# Patient Record
Sex: FEMALE | Race: WHITE | NOT HISPANIC OR LATINO | Employment: OTHER | URBAN - METROPOLITAN AREA
[De-identification: names, ages, dates, MRNs, and addresses within clinical notes are randomized per-mention and may not be internally consistent; named-entity substitution may affect disease eponyms.]

---

## 2017-05-09 ENCOUNTER — HISTORICAL (OUTPATIENT)
Dept: ADMINISTRATIVE | Facility: HOSPITAL | Age: 57
End: 2017-05-09

## 2017-05-09 LAB
ABS NEUT (OLG): 2.2 X10(3)/MCL (ref 2.1–9.2)
ALBUMIN SERPL-MCNC: 3.7 GM/DL (ref 3.4–5)
ALBUMIN/GLOB SERPL: 1.3 {RATIO}
ALP SERPL-CCNC: 65 UNIT/L (ref 38–126)
ALT SERPL-CCNC: 22 UNIT/L (ref 12–78)
AST SERPL-CCNC: 14 UNIT/L (ref 15–37)
BASOPHILS # BLD AUTO: 0 X10(3)/MCL (ref 0–0.2)
BASOPHILS NFR BLD AUTO: 1 %
BILIRUB SERPL-MCNC: 0.4 MG/DL (ref 0.2–1)
BILIRUBIN DIRECT+TOT PNL SERPL-MCNC: 0.1 MG/DL (ref 0–0.2)
BILIRUBIN DIRECT+TOT PNL SERPL-MCNC: 0.3 MG/DL (ref 0–0.8)
BUN SERPL-MCNC: 12 MG/DL (ref 7–18)
CALCIUM SERPL-MCNC: 8.9 MG/DL (ref 8.5–10.1)
CHLORIDE SERPL-SCNC: 105 MMOL/L (ref 98–107)
CHOLEST SERPL-MCNC: 250 MG/DL (ref 0–200)
CHOLEST/HDLC SERPL: 2.7 {RATIO} (ref 0–4)
CO2 SERPL-SCNC: 31 MMOL/L (ref 21–32)
CREAT SERPL-MCNC: 0.66 MG/DL (ref 0.55–1.02)
DEPRECATED CALCIDIOL+CALCIFEROL SERPL-MC: 40.7 NG/ML (ref 30–80)
EOSINOPHIL # BLD AUTO: 0.1 X10(3)/MCL (ref 0–0.9)
EOSINOPHIL NFR BLD AUTO: 2 %
ERYTHROCYTE [DISTWIDTH] IN BLOOD BY AUTOMATED COUNT: 12.2 % (ref 11.5–17)
GLOBULIN SER-MCNC: 2.9 GM/DL (ref 2.4–3.5)
GLUCOSE SERPL-MCNC: 94 MG/DL (ref 74–106)
HCT VFR BLD AUTO: 41.1 % (ref 37–47)
HCV AB SERPL QL IA: NEGATIVE
HDLC SERPL-MCNC: 93 MG/DL (ref 35–60)
HGB BLD-MCNC: 13.4 GM/DL (ref 12–16)
LDLC SERPL CALC-MCNC: 127 MG/DL (ref 0–129)
LYMPHOCYTES # BLD AUTO: 1.4 X10(3)/MCL (ref 0.6–4.6)
LYMPHOCYTES NFR BLD AUTO: 34 %
MCH RBC QN AUTO: 31.1 PG (ref 27–31)
MCHC RBC AUTO-ENTMCNC: 32.6 GM/DL (ref 33–36)
MCV RBC AUTO: 95.4 FL (ref 80–94)
MONOCYTES # BLD AUTO: 0.3 X10(3)/MCL (ref 0.1–1.3)
MONOCYTES NFR BLD AUTO: 7 %
NEUTROPHILS # BLD AUTO: 2.2 X10(3)/MCL (ref 2.1–9.2)
NEUTROPHILS NFR BLD AUTO: 55 %
PLATELET # BLD AUTO: 241 X10(3)/MCL (ref 130–400)
PMV BLD AUTO: 9.6 FL (ref 9.4–12.4)
POTASSIUM SERPL-SCNC: 4.2 MMOL/L (ref 3.5–5.1)
PROT SERPL-MCNC: 6.6 GM/DL (ref 6.4–8.2)
RBC # BLD AUTO: 4.31 X10(6)/MCL (ref 4.2–5.4)
SODIUM SERPL-SCNC: 141 MMOL/L (ref 136–145)
TRIGL SERPL-MCNC: 151 MG/DL (ref 30–150)
TSH SERPL-ACNC: 1.29 MIU/ML (ref 0.36–3.74)
VLDLC SERPL CALC-MCNC: 30 MG/DL
WBC # SPEC AUTO: 4 X10(3)/MCL (ref 4.5–11.5)

## 2017-10-19 ENCOUNTER — HISTORICAL (OUTPATIENT)
Dept: ADMINISTRATIVE | Facility: HOSPITAL | Age: 57
End: 2017-10-19

## 2017-10-19 LAB
ABS NEUT (OLG): 2.88 X10(3)/MCL (ref 2.1–9.2)
ALBUMIN SERPL-MCNC: 3.9 GM/DL (ref 3.4–5)
ALBUMIN/GLOB SERPL: 1.4 {RATIO}
ALP SERPL-CCNC: 68 UNIT/L (ref 38–126)
ALT SERPL-CCNC: 26 UNIT/L (ref 12–78)
APPEARANCE, UA: CLEAR
AST SERPL-CCNC: 15 UNIT/L (ref 15–37)
BACTERIA SPEC CULT: NORMAL /HPF
BASOPHILS # BLD AUTO: 0 X10(3)/MCL (ref 0–0.2)
BASOPHILS NFR BLD AUTO: 1 %
BILIRUB SERPL-MCNC: 0.3 MG/DL (ref 0.2–1)
BILIRUB UR QL STRIP: NEGATIVE
BILIRUBIN DIRECT+TOT PNL SERPL-MCNC: 0.1 MG/DL (ref 0–0.2)
BILIRUBIN DIRECT+TOT PNL SERPL-MCNC: 0.2 MG/DL (ref 0–0.8)
BUN SERPL-MCNC: 22 MG/DL (ref 7–18)
CALCIUM SERPL-MCNC: 8.7 MG/DL (ref 8.5–10.1)
CHLORIDE SERPL-SCNC: 105 MMOL/L (ref 98–107)
CO2 SERPL-SCNC: 28 MMOL/L (ref 21–32)
COLOR UR: YELLOW
CREAT SERPL-MCNC: 0.63 MG/DL (ref 0.55–1.02)
EOSINOPHIL # BLD AUTO: 0.1 X10(3)/MCL (ref 0–0.9)
EOSINOPHIL NFR BLD AUTO: 1 %
ERYTHROCYTE [DISTWIDTH] IN BLOOD BY AUTOMATED COUNT: 11.9 % (ref 11.5–17)
GLOBULIN SER-MCNC: 2.8 GM/DL (ref 2.4–3.5)
GLUCOSE (UA): NEGATIVE
GLUCOSE SERPL-MCNC: 92 MG/DL (ref 74–106)
HCT VFR BLD AUTO: 40 % (ref 37–47)
HGB BLD-MCNC: 13.8 GM/DL (ref 12–16)
HGB UR QL STRIP: NEGATIVE
KETONES UR QL STRIP: NEGATIVE
LEUKOCYTE ESTERASE UR QL STRIP: NEGATIVE
LYMPHOCYTES # BLD AUTO: 1.7 X10(3)/MCL (ref 0.6–4.6)
LYMPHOCYTES NFR BLD AUTO: 33 %
MCH RBC QN AUTO: 32.7 PG (ref 27–31)
MCHC RBC AUTO-ENTMCNC: 34.5 GM/DL (ref 33–36)
MCV RBC AUTO: 94.8 FL (ref 80–94)
MONOCYTES # BLD AUTO: 0.5 X10(3)/MCL (ref 0.1–1.3)
MONOCYTES NFR BLD AUTO: 9 %
NEUTROPHILS # BLD AUTO: 2.88 X10(3)/MCL (ref 1.4–7.9)
NEUTROPHILS NFR BLD AUTO: 55 %
NITRITE UR QL STRIP: NEGATIVE
PH UR STRIP: 6.5 [PH] (ref 5–9)
PLATELET # BLD AUTO: 207 X10(3)/MCL (ref 130–400)
PMV BLD AUTO: 9 FL (ref 9.4–12.4)
POTASSIUM SERPL-SCNC: 4.1 MMOL/L (ref 3.5–5.1)
PROT SERPL-MCNC: 6.7 GM/DL (ref 6.4–8.2)
PROT UR QL STRIP: NEGATIVE
RBC # BLD AUTO: 4.22 X10(6)/MCL (ref 4.2–5.4)
RBC #/AREA URNS HPF: NORMAL /[HPF]
SODIUM SERPL-SCNC: 140 MMOL/L (ref 136–145)
SP GR UR STRIP: 1.02 (ref 1–1.03)
SQUAMOUS EPITHELIAL, UA: NORMAL
UROBILINOGEN UR STRIP-ACNC: 0.2
WBC # SPEC AUTO: 5.2 X10(3)/MCL (ref 4.5–11.5)
WBC #/AREA URNS HPF: NORMAL /[HPF]

## 2018-06-04 ENCOUNTER — HISTORICAL (OUTPATIENT)
Dept: ADMINISTRATIVE | Facility: HOSPITAL | Age: 58
End: 2018-06-04

## 2018-06-04 LAB
ABS NEUT (OLG): 2.81 X10(3)/MCL (ref 2.1–9.2)
ALBUMIN SERPL-MCNC: 3.7 GM/DL (ref 3.4–5)
ALBUMIN/GLOB SERPL: 1.3 {RATIO}
ALP SERPL-CCNC: 61 UNIT/L (ref 38–126)
ALT SERPL-CCNC: 19 UNIT/L (ref 12–78)
APPEARANCE, UA: ABNORMAL
AST SERPL-CCNC: 14 UNIT/L (ref 15–37)
BACTERIA SPEC CULT: ABNORMAL /HPF
BASOPHILS # BLD AUTO: 0 X10(3)/MCL (ref 0–0.2)
BASOPHILS NFR BLD AUTO: 1 %
BILIRUB SERPL-MCNC: 0.5 MG/DL (ref 0.2–1)
BILIRUB UR QL STRIP: NEGATIVE
BILIRUBIN DIRECT+TOT PNL SERPL-MCNC: 0.1 MG/DL (ref 0–0.2)
BILIRUBIN DIRECT+TOT PNL SERPL-MCNC: 0.4 MG/DL (ref 0–0.8)
BUN SERPL-MCNC: 22 MG/DL (ref 7–18)
CALCIUM SERPL-MCNC: 8.6 MG/DL (ref 8.5–10.1)
CHLORIDE SERPL-SCNC: 106 MMOL/L (ref 98–107)
CHOLEST SERPL-MCNC: 238 MG/DL (ref 0–200)
CHOLEST/HDLC SERPL: 2.4 {RATIO} (ref 0–4)
CO2 SERPL-SCNC: 29 MMOL/L (ref 21–32)
COLOR UR: YELLOW
CREAT SERPL-MCNC: 0.6 MG/DL (ref 0.55–1.02)
DEPRECATED CALCIDIOL+CALCIFEROL SERPL-MC: 31 NG/ML (ref 30–80)
EOSINOPHIL # BLD AUTO: 0.1 X10(3)/MCL (ref 0–0.9)
EOSINOPHIL NFR BLD AUTO: 2 %
ERYTHROCYTE [DISTWIDTH] IN BLOOD BY AUTOMATED COUNT: 11.7 % (ref 11.5–17)
GLOBULIN SER-MCNC: 2.9 GM/DL (ref 2.4–3.5)
GLUCOSE (UA): NEGATIVE
GLUCOSE SERPL-MCNC: 88 MG/DL (ref 74–106)
HCT VFR BLD AUTO: 41.2 % (ref 37–47)
HDLC SERPL-MCNC: 99 MG/DL (ref 35–60)
HGB BLD-MCNC: 13.3 GM/DL (ref 12–16)
HGB UR QL STRIP: ABNORMAL
KETONES UR QL STRIP: NEGATIVE
LDLC SERPL CALC-MCNC: 126 MG/DL (ref 0–129)
LEUKOCYTE ESTERASE UR QL STRIP: ABNORMAL
LYMPHOCYTES # BLD AUTO: 1.6 X10(3)/MCL (ref 0.6–4.6)
LYMPHOCYTES NFR BLD AUTO: 32 %
MCH RBC QN AUTO: 31.4 PG (ref 27–31)
MCHC RBC AUTO-ENTMCNC: 32.3 GM/DL (ref 33–36)
MCV RBC AUTO: 97.2 FL (ref 80–94)
MONOCYTES # BLD AUTO: 0.5 X10(3)/MCL (ref 0.1–1.3)
MONOCYTES NFR BLD AUTO: 10 %
NEUTROPHILS # BLD AUTO: 2.81 X10(3)/MCL (ref 1.4–7.9)
NEUTROPHILS NFR BLD AUTO: 56 %
NITRITE UR QL STRIP: NEGATIVE
PH UR STRIP: 5.5 [PH] (ref 5–9)
PLATELET # BLD AUTO: 231 X10(3)/MCL (ref 130–400)
PMV BLD AUTO: 9.4 FL (ref 9.4–12.4)
POTASSIUM SERPL-SCNC: 4.1 MMOL/L (ref 3.5–5.1)
PROT SERPL-MCNC: 6.6 GM/DL (ref 6.4–8.2)
PROT UR QL STRIP: ABNORMAL
RBC # BLD AUTO: 4.24 X10(6)/MCL (ref 4.2–5.4)
RBC #/AREA URNS HPF: ABNORMAL /[HPF]
SODIUM SERPL-SCNC: 143 MMOL/L (ref 136–145)
SP GR UR STRIP: 1.03 (ref 1–1.03)
SQUAMOUS EPITHELIAL, UA: 10 /HPF (ref 0–4)
TRIGL SERPL-MCNC: 67 MG/DL (ref 30–150)
TSH SERPL-ACNC: 1.05 MIU/L (ref 0.36–3.74)
UROBILINOGEN UR STRIP-ACNC: 1
VLDLC SERPL CALC-MCNC: 13 MG/DL
WBC # SPEC AUTO: 5 X10(3)/MCL (ref 4.5–11.5)
WBC #/AREA URNS HPF: 29 /HPF (ref 0–3)

## 2018-08-28 LAB — CRC RECOMMENDATION EXT: NORMAL

## 2018-11-05 ENCOUNTER — HISTORICAL (OUTPATIENT)
Dept: ADMINISTRATIVE | Facility: HOSPITAL | Age: 58
End: 2018-11-05

## 2018-11-05 LAB
ABS NEUT (OLG): 2.86 X10(3)/MCL (ref 2.1–9.2)
APTT PPP: 25.2 SECOND(S) (ref 24.8–36.9)
BASOPHILS # BLD AUTO: 0 X10(3)/MCL (ref 0–0.2)
BASOPHILS NFR BLD AUTO: 1 %
EOSINOPHIL # BLD AUTO: 0 X10(3)/MCL (ref 0–0.9)
EOSINOPHIL NFR BLD AUTO: 1 %
ERYTHROCYTE [DISTWIDTH] IN BLOOD BY AUTOMATED COUNT: 11.9 % (ref 11.5–17)
ERYTHROCYTE [SEDIMENTATION RATE] IN BLOOD: 14 MM/HR (ref 0–20)
HCT VFR BLD AUTO: 37.6 % (ref 37–47)
HGB BLD-MCNC: 12.4 GM/DL (ref 12–16)
INR PPP: 0.96 (ref 0–1.27)
LYMPHOCYTES # BLD AUTO: 1 X10(3)/MCL (ref 0.6–4.6)
LYMPHOCYTES NFR BLD AUTO: 22 %
MCH RBC QN AUTO: 31.7 PG (ref 27–31)
MCHC RBC AUTO-ENTMCNC: 33 GM/DL (ref 33–36)
MCV RBC AUTO: 96.2 FL (ref 80–94)
MONOCYTES # BLD AUTO: 0.4 X10(3)/MCL (ref 0.1–1.3)
MONOCYTES NFR BLD AUTO: 10 %
NEUTROPHILS # BLD AUTO: 2.86 X10(3)/MCL (ref 2.1–9.2)
NEUTROPHILS NFR BLD AUTO: 66 %
PLATELET # BLD AUTO: 214 X10(3)/MCL (ref 130–400)
PMV BLD AUTO: 8.8 FL (ref 9.4–12.4)
PROTHROMBIN TIME: 13.1 SECOND(S) (ref 12.2–14.7)
RBC # BLD AUTO: 3.91 X10(6)/MCL (ref 4.2–5.4)
URATE SERPL-MCNC: 2.8 MG/DL (ref 2.6–7.2)
WBC # SPEC AUTO: 4.4 X10(3)/MCL (ref 4.5–11.5)

## 2019-02-05 ENCOUNTER — HISTORICAL (OUTPATIENT)
Dept: ADMINISTRATIVE | Facility: HOSPITAL | Age: 59
End: 2019-02-05

## 2019-07-08 ENCOUNTER — HISTORICAL (OUTPATIENT)
Dept: ADMINISTRATIVE | Facility: HOSPITAL | Age: 59
End: 2019-07-08

## 2019-07-08 LAB
ABS NEUT (OLG): 2.12 X10(3)/MCL (ref 2.1–9.2)
ALBUMIN SERPL-MCNC: 3.9 GM/DL (ref 3.4–5)
ALBUMIN/GLOB SERPL: 1.4 RATIO (ref 1.1–2)
ALP SERPL-CCNC: 68 UNIT/L (ref 38–126)
ALT SERPL-CCNC: 27 UNIT/L (ref 12–78)
APPEARANCE, UA: ABNORMAL
AST SERPL-CCNC: 17 UNIT/L (ref 15–37)
BACTERIA SPEC CULT: ABNORMAL /HPF
BASOPHILS NFR BLD MANUAL: 1 % (ref 0–2)
BILIRUB SERPL-MCNC: 0.6 MG/DL (ref 0.2–1)
BILIRUB UR QL STRIP: NEGATIVE
BILIRUBIN DIRECT+TOT PNL SERPL-MCNC: 0.2 MG/DL (ref 0–0.5)
BILIRUBIN DIRECT+TOT PNL SERPL-MCNC: 0.4 MG/DL (ref 0–0.8)
BUN SERPL-MCNC: 19 MG/DL (ref 7–18)
CALCIUM SERPL-MCNC: 8.9 MG/DL (ref 8.5–10.1)
CHLORIDE SERPL-SCNC: 107 MMOL/L (ref 98–107)
CHOLEST SERPL-MCNC: 227 MG/DL (ref 0–200)
CHOLEST/HDLC SERPL: 2.4 {RATIO} (ref 0–4)
CO2 SERPL-SCNC: 30 MMOL/L (ref 21–32)
COLOR UR: YELLOW
CREAT SERPL-MCNC: 0.66 MG/DL (ref 0.55–1.02)
DEPRECATED CALCIDIOL+CALCIFEROL SERPL-MC: 41.03 NG/ML (ref 30–80)
ERYTHROCYTE [DISTWIDTH] IN BLOOD BY AUTOMATED COUNT: 12.2 % (ref 11.5–17)
GLOBULIN SER-MCNC: 2.7 GM/DL (ref 2.4–3.5)
GLUCOSE (UA): NEGATIVE
GLUCOSE SERPL-MCNC: 99 MG/DL (ref 74–106)
HCT VFR BLD AUTO: 40.5 % (ref 37–47)
HDLC SERPL-MCNC: 95 MG/DL (ref 35–60)
HGB BLD-MCNC: 12.8 GM/DL (ref 12–16)
HGB UR QL STRIP: ABNORMAL
KETONES UR QL STRIP: NEGATIVE
LDLC SERPL CALC-MCNC: 115 MG/DL (ref 0–129)
LEUKOCYTE ESTERASE UR QL STRIP: ABNORMAL
LYMPHOCYTES NFR BLD MANUAL: 32 % (ref 13–40)
MCH RBC QN AUTO: 30.9 PG (ref 27–31)
MCHC RBC AUTO-ENTMCNC: 31.6 GM/DL (ref 33–36)
MCV RBC AUTO: 97.8 FL (ref 80–94)
MONOCYTES NFR BLD MANUAL: 13 % (ref 2–11)
NEUTROPHILS NFR BLD MANUAL: 54 % (ref 47–80)
NITRITE UR QL STRIP: NEGATIVE
PH UR STRIP: 6 [PH] (ref 5–9)
PLATELET # BLD AUTO: 208 X10(3)/MCL (ref 130–400)
PLATELET # BLD EST: NORMAL 10*3/UL
PMV BLD AUTO: 9 FL (ref 7.4–10.4)
POTASSIUM SERPL-SCNC: 3.9 MMOL/L (ref 3.5–5.1)
PROT SERPL-MCNC: 6.6 GM/DL (ref 6.4–8.2)
PROT UR QL STRIP: ABNORMAL
RBC # BLD AUTO: 4.14 X10(6)/MCL (ref 4.2–5.4)
RBC #/AREA URNS HPF: 5 /HPF (ref 0–2)
SODIUM SERPL-SCNC: 142 MMOL/L (ref 136–145)
SP GR UR STRIP: 1.03 (ref 1–1.03)
SQUAMOUS EPITHELIAL, UA: ABNORMAL
TRIGL SERPL-MCNC: 84 MG/DL (ref 30–150)
TSH SERPL-ACNC: 2.22 MIU/L (ref 0.36–3.74)
UROBILINOGEN UR STRIP-ACNC: 1
VLDLC SERPL CALC-MCNC: 17 MG/DL
WBC # SPEC AUTO: 3.8 X10(3)/MCL (ref 4.5–11.5)
WBC #/AREA URNS HPF: 49 /HPF (ref 0–3)

## 2019-07-09 LAB — FINAL CULTURE: NORMAL

## 2020-07-14 ENCOUNTER — HISTORICAL (OUTPATIENT)
Dept: ADMINISTRATIVE | Facility: HOSPITAL | Age: 60
End: 2020-07-14

## 2020-07-14 LAB
ABS NEUT (OLG): 1.95 X10(3)/MCL (ref 2.1–9.2)
ALBUMIN SERPL-MCNC: 3.5 GM/DL (ref 3.5–5)
ALBUMIN/GLOB SERPL: 1.5 RATIO (ref 1.1–2)
ALP SERPL-CCNC: 56 UNIT/L (ref 40–150)
ALT SERPL-CCNC: 14 UNIT/L (ref 0–55)
APPEARANCE, UA: CLEAR
AST SERPL-CCNC: 14 UNIT/L (ref 5–34)
BACTERIA SPEC CULT: ABNORMAL /HPF
BASOPHILS # BLD AUTO: 0 X10(3)/MCL (ref 0–0.2)
BASOPHILS NFR BLD AUTO: 1 %
BILIRUB SERPL-MCNC: 0.4 MG/DL
BILIRUB UR QL STRIP: NEGATIVE
BILIRUBIN DIRECT+TOT PNL SERPL-MCNC: 0.2 MG/DL (ref 0–0.5)
BILIRUBIN DIRECT+TOT PNL SERPL-MCNC: 0.2 MG/DL (ref 0–0.8)
BUN SERPL-MCNC: 25.9 MG/DL (ref 9.8–20.1)
CALCIUM SERPL-MCNC: 8.4 MG/DL (ref 8.4–10.2)
CHLORIDE SERPL-SCNC: 108 MMOL/L (ref 98–107)
CHOLEST SERPL-MCNC: 228 MG/DL
CHOLEST/HDLC SERPL: 3 {RATIO} (ref 0–5)
CO2 SERPL-SCNC: 27 MMOL/L (ref 22–29)
COLOR UR: YELLOW
CREAT SERPL-MCNC: 0.62 MG/DL (ref 0.55–1.02)
DEPRECATED CALCIDIOL+CALCIFEROL SERPL-MC: 49.3 NG/ML (ref 6.6–49.9)
EOSINOPHIL # BLD AUTO: 0.1 X10(3)/MCL (ref 0–0.9)
EOSINOPHIL NFR BLD AUTO: 4 %
ERYTHROCYTE [DISTWIDTH] IN BLOOD BY AUTOMATED COUNT: 12 % (ref 11.5–17)
GLOBULIN SER-MCNC: 2.4 GM/DL (ref 2.4–3.5)
GLUCOSE (UA): NEGATIVE
GLUCOSE SERPL-MCNC: 96 MG/DL (ref 74–100)
HCT VFR BLD AUTO: 37.5 % (ref 37–47)
HCV AB SERPL QL IA: NONREACTIVE
HDLC SERPL-MCNC: 82 MG/DL (ref 35–60)
HGB BLD-MCNC: 12.5 GM/DL (ref 12–16)
HGB UR QL STRIP: NEGATIVE
KETONES UR QL STRIP: NEGATIVE
LDLC SERPL CALC-MCNC: 138 MG/DL (ref 50–140)
LEUKOCYTE ESTERASE UR QL STRIP: ABNORMAL
LYMPHOCYTES # BLD AUTO: 1 X10(3)/MCL (ref 0.6–4.6)
LYMPHOCYTES NFR BLD AUTO: 30 %
MCH RBC QN AUTO: 31.3 PG (ref 27–31)
MCHC RBC AUTO-ENTMCNC: 33.3 GM/DL (ref 33–36)
MCV RBC AUTO: 94 FL (ref 80–94)
MONOCYTES # BLD AUTO: 0.3 X10(3)/MCL (ref 0.1–1.3)
MONOCYTES NFR BLD AUTO: 9 %
NEUTROPHILS # BLD AUTO: 1.95 X10(3)/MCL (ref 2.1–9.2)
NEUTROPHILS NFR BLD AUTO: 56 %
NITRITE UR QL STRIP: NEGATIVE
PH UR STRIP: 5 [PH] (ref 5–9)
PLATELET # BLD AUTO: 192 X10(3)/MCL (ref 130–400)
PMV BLD AUTO: 9.1 FL (ref 9.4–12.4)
POTASSIUM SERPL-SCNC: 4.3 MMOL/L (ref 3.5–5.1)
PROT SERPL-MCNC: 5.9 GM/DL (ref 6.4–8.3)
PROT UR QL STRIP: NEGATIVE
RBC # BLD AUTO: 3.99 X10(6)/MCL (ref 4.2–5.4)
RBC #/AREA URNS HPF: ABNORMAL /[HPF]
SODIUM SERPL-SCNC: 142 MMOL/L (ref 136–145)
SP GR UR STRIP: 1.02 (ref 1–1.03)
SQUAMOUS EPITHELIAL, UA: ABNORMAL
TRIGL SERPL-MCNC: 38 MG/DL (ref 37–140)
TSH SERPL-ACNC: 1.36 UIU/ML (ref 0.35–4.94)
UROBILINOGEN UR STRIP-ACNC: 0.2
VLDLC SERPL CALC-MCNC: 8 MG/DL
WBC # SPEC AUTO: 3.5 X10(3)/MCL (ref 4.5–11.5)
WBC #/AREA URNS HPF: ABNORMAL /[HPF]

## 2021-10-27 ENCOUNTER — HISTORICAL (OUTPATIENT)
Dept: ADMINISTRATIVE | Facility: HOSPITAL | Age: 61
End: 2021-10-27

## 2021-10-27 LAB
ABS NEUT (OLG): 2.61 X10(3)/MCL (ref 2.1–9.2)
ALBUMIN SERPL-MCNC: 3.7 GM/DL (ref 3.4–4.8)
ALBUMIN/GLOB SERPL: 1.5 RATIO (ref 1.1–2)
ALP SERPL-CCNC: 56 UNIT/L (ref 40–150)
ALT SERPL-CCNC: 16 UNIT/L (ref 0–55)
APPEARANCE, UA: CLEAR
AST SERPL-CCNC: 17 UNIT/L (ref 5–34)
BACTERIA SPEC CULT: ABNORMAL /HPF
BASOPHILS # BLD AUTO: 0.1 X10(3)/MCL (ref 0–0.2)
BASOPHILS NFR BLD AUTO: 1 %
BILIRUB SERPL-MCNC: 0.6 MG/DL
BILIRUB UR QL STRIP: NEGATIVE
BILIRUBIN DIRECT+TOT PNL SERPL-MCNC: 0.2 MG/DL (ref 0–0.5)
BILIRUBIN DIRECT+TOT PNL SERPL-MCNC: 0.4 MG/DL (ref 0–0.8)
BUN SERPL-MCNC: 20.5 MG/DL (ref 9.8–20.1)
CALCIUM SERPL-MCNC: 9.1 MG/DL (ref 8.7–10.5)
CHLORIDE SERPL-SCNC: 110 MMOL/L (ref 98–107)
CHOLEST SERPL-MCNC: 257 MG/DL
CHOLEST/HDLC SERPL: 3 {RATIO} (ref 0–5)
CO2 SERPL-SCNC: 28 MMOL/L (ref 23–31)
COLOR UR: YELLOW
CREAT SERPL-MCNC: 0.73 MG/DL (ref 0.55–1.02)
DEPRECATED CALCIDIOL+CALCIFEROL SERPL-MC: 62.6 NG/ML (ref 30–80)
EOSINOPHIL # BLD AUTO: 0.1 X10(3)/MCL (ref 0–0.9)
EOSINOPHIL NFR BLD AUTO: 3 %
ERYTHROCYTE [DISTWIDTH] IN BLOOD BY AUTOMATED COUNT: 12.1 % (ref 11.5–17)
GLOBULIN SER-MCNC: 2.4 GM/DL (ref 2.4–3.5)
GLUCOSE (UA): NEGATIVE
GLUCOSE SERPL-MCNC: 97 MG/DL (ref 82–115)
HCT VFR BLD AUTO: 38.6 % (ref 37–47)
HDLC SERPL-MCNC: 91 MG/DL (ref 35–60)
HGB BLD-MCNC: 12.9 GM/DL (ref 12–16)
HGB UR QL STRIP: NEGATIVE
KETONES UR QL STRIP: NEGATIVE
LDLC SERPL CALC-MCNC: 154 MG/DL (ref 50–140)
LEUKOCYTE ESTERASE UR QL STRIP: ABNORMAL
LYMPHOCYTES # BLD AUTO: 1.3 X10(3)/MCL (ref 0.6–4.6)
LYMPHOCYTES NFR BLD AUTO: 28 %
MCH RBC QN AUTO: 32.3 PG (ref 27–31)
MCHC RBC AUTO-ENTMCNC: 33.4 GM/DL (ref 33–36)
MCV RBC AUTO: 96.5 FL (ref 80–94)
MONOCYTES # BLD AUTO: 0.5 X10(3)/MCL (ref 0.1–1.3)
MONOCYTES NFR BLD AUTO: 12 %
NEUTROPHILS # BLD AUTO: 2.61 X10(3)/MCL (ref 2.1–9.2)
NEUTROPHILS NFR BLD AUTO: 57 %
NITRITE UR QL STRIP: NEGATIVE
PH UR STRIP: 5 [PH] (ref 5–9)
PLATELET # BLD AUTO: 245 X10(3)/MCL (ref 130–400)
PMV BLD AUTO: 9.7 FL (ref 9.4–12.4)
POTASSIUM SERPL-SCNC: 4.7 MMOL/L (ref 3.5–5.1)
PROT SERPL-MCNC: 6.1 GM/DL (ref 5.8–7.6)
PROT UR QL STRIP: NEGATIVE
RBC # BLD AUTO: 4 X10(6)/MCL (ref 4.2–5.4)
RBC #/AREA URNS HPF: ABNORMAL /[HPF]
SODIUM SERPL-SCNC: 144 MMOL/L (ref 136–145)
SP GR UR STRIP: 1.02 (ref 1–1.03)
SQUAMOUS EPITHELIAL, UA: ABNORMAL /HPF (ref 0–4)
TRIGL SERPL-MCNC: 58 MG/DL (ref 37–140)
UROBILINOGEN UR STRIP-ACNC: 0.2
VLDLC SERPL CALC-MCNC: 12 MG/DL
WBC # SPEC AUTO: 4.6 X10(3)/MCL (ref 4.5–11.5)
WBC #/AREA URNS HPF: ABNORMAL /[HPF]

## 2021-12-08 ENCOUNTER — HISTORICAL (OUTPATIENT)
Dept: ADMINISTRATIVE | Facility: HOSPITAL | Age: 61
End: 2021-12-08

## 2022-06-01 ENCOUNTER — TELEPHONE (OUTPATIENT)
Dept: INTERNAL MEDICINE | Facility: CLINIC | Age: 62
End: 2022-06-01

## 2022-06-01 NOTE — TELEPHONE ENCOUNTER
Phone call to Grace.  Left message reporting her CT chest was stable with no progression of the nodules.

## 2022-06-23 DIAGNOSIS — G47.30 SLEEP APNEA, UNSPECIFIED TYPE: Primary | ICD-10-CM

## 2022-07-14 ENCOUNTER — TELEPHONE (OUTPATIENT)
Dept: INTERNAL MEDICINE | Facility: CLINIC | Age: 62
End: 2022-07-14

## 2022-07-14 NOTE — TELEPHONE ENCOUNTER
----- Message from Gretchen Simons sent at 7/14/2022  9:58 AM CDT -----  Regarding: covid positive  Patient tested positive for covid allergic to penicillin. Uses super 1 yamila/lala.  Call her at [phone]

## 2022-07-27 LAB
HUMAN PAPILLOMAVIRUS (HPV): NORMAL
PAP RECOMMENDATION EXT: NORMAL
PAP SMEAR: NORMAL

## 2022-09-08 ENCOUNTER — OFFICE VISIT (OUTPATIENT)
Dept: NEUROLOGY | Facility: CLINIC | Age: 62
End: 2022-09-08
Payer: COMMERCIAL

## 2022-09-08 VITALS
HEIGHT: 63 IN | DIASTOLIC BLOOD PRESSURE: 80 MMHG | WEIGHT: 134 LBS | BODY MASS INDEX: 23.74 KG/M2 | SYSTOLIC BLOOD PRESSURE: 116 MMHG

## 2022-09-08 DIAGNOSIS — R06.83 LOUD SNORING: Primary | ICD-10-CM

## 2022-09-08 DIAGNOSIS — R29.818 SUSPECTED SLEEP APNEA: ICD-10-CM

## 2022-09-08 DIAGNOSIS — I10 PRIMARY HYPERTENSION: ICD-10-CM

## 2022-09-08 PROCEDURE — 99999 PR PBB SHADOW E&M-EST. PATIENT-LVL III: CPT | Mod: PBBFAC,,, | Performed by: SPECIALIST

## 2022-09-08 PROCEDURE — 99999 PR PBB SHADOW E&M-EST. PATIENT-LVL III: ICD-10-PCS | Mod: PBBFAC,,, | Performed by: SPECIALIST

## 2022-09-08 PROCEDURE — 99204 PR OFFICE/OUTPT VISIT, NEW, LEVL IV, 45-59 MIN: ICD-10-PCS | Mod: S$GLB,,, | Performed by: SPECIALIST

## 2022-09-08 PROCEDURE — 99204 OFFICE O/P NEW MOD 45 MIN: CPT | Mod: S$GLB,,, | Performed by: SPECIALIST

## 2022-09-08 RX ORDER — MELATONIN 5 MG
1 CAPSULE ORAL NIGHTLY
COMMUNITY

## 2022-09-08 RX ORDER — MONTELUKAST SODIUM 10 MG/1
10 TABLET ORAL NIGHTLY
COMMUNITY
End: 2022-09-19 | Stop reason: SDUPTHER

## 2022-09-08 RX ORDER — VALACYCLOVIR HYDROCHLORIDE 1 G/1
1000 TABLET, FILM COATED ORAL 3 TIMES DAILY
COMMUNITY
Start: 2022-09-01 | End: 2022-09-19 | Stop reason: SDUPTHER

## 2022-09-08 RX ORDER — VALSARTAN 80 MG/1
80 TABLET ORAL DAILY
COMMUNITY
Start: 2022-09-02 | End: 2022-09-19 | Stop reason: SDUPTHER

## 2022-09-08 RX ORDER — VIT C/E/ZN/COPPR/LUTEIN/ZEAXAN 250MG-90MG
1000 CAPSULE ORAL DAILY
COMMUNITY

## 2022-09-08 RX ORDER — IBUPROFEN AND FAMOTIDINE 800; 26.6 MG/1; MG/1
1 TABLET, COATED ORAL DAILY PRN
COMMUNITY
Start: 2022-09-01 | End: 2022-09-19 | Stop reason: SDUPTHER

## 2022-09-08 RX ORDER — ALPRAZOLAM 0.25 MG/1
0.25 TABLET ORAL DAILY PRN
COMMUNITY
Start: 2022-09-01 | End: 2022-10-11 | Stop reason: SDUPTHER

## 2022-09-08 NOTE — PROGRESS NOTES
"Subjective:       Patient ID: Zandra Levy is a 61 y.o. female.    Chief Complaint: snoring poss naomy     HPI:            New Patient  (NP-sleep evaluation/Patient c/o snoring, mild excessive daytime sleepiness /ESS 8/Neck size 12 in)  Has HTN and is concerned about possibility of sleep apnea   Here w rubi who is already wearing PAP but needs his own first official sleep study and new machine     notes may also be on facesheet for HPI, ROS, and other sections   Review of Systems    ESS= 8    Also has hx R Dupuytren's surgery and then developed numbness ulnar R hand   Also has elbow sensitivity and cervical spine degeneration by her report   Has seen Paty Mendoza and Yoandy        Social History     Socioeconomic History    Marital status:    Tobacco Use    Smoking status: Former     Types: Cigarettes    Smokeless tobacco: Never     ----------------------------  Anxiety disorder, unspecified  Heartburn  Hypertension      Current Outpatient Medications:     ALPRAZolam (XANAX) 0.25 MG tablet, Take 0.25 mg by mouth daily as needed., Disp: , Rfl:     cholecalciferol, vitamin D3, (VITAMIN D3) 25 mcg (1,000 unit) capsule, Take 1,000 Units by mouth once daily., Disp: , Rfl:     DUEXIS 800-26.6 mg Tab, Take 1 tablet by mouth daily as needed., Disp: , Rfl:     EScitalopram oxalate (LEXAPRO) 10 MG tablet, TAKE 1 TABLET DAILY, Disp: 90 tablet, Rfl: 3    FAMOTIDINE ORAL, Take 26.6 mg by mouth daily as needed., Disp: , Rfl:     melatonin 5 mg Cap, Take by mouth., Disp: , Rfl:     montelukast (SINGULAIR) 10 mg tablet, Take 10 mg by mouth every evening., Disp: , Rfl:     valACYclovir (VALTREX) 1000 MG tablet, Take 1,000 mg by mouth 3 (three) times daily., Disp: , Rfl:     valsartan (DIOVAN) 80 MG tablet, Take 80 mg by mouth once daily., Disp: , Rfl:      Objective:        Exam:   /80 (BP Location: Right arm, Patient Position: Sitting, BP Method: Medium (Manual))   Ht 5' 3" (1.6 m)   Wt 60.8 kg (134 lb)  "  BMI 23.74 kg/m²       General Exam  if accompanied, by__ h    mental status_alert and appropriate  Oropharynx Mallampati grade_ 2    body habitus_ Body mass index is 23.74 kg/m².     Heart_ RRR      Neurological    Speech __ normal   cranial nerves:  EOMS ok   CN 7_no lower face asymmetry  CN 12 tongue_ok  Motor__ ok   Gait: unassisted    Labs:      Lengthy discussion about the risks of untreated moderate to severe obstructive sleep apnea (ABHISHEK).   Testing modalities/test options for sleep apnea discussed.  Potential need for treatment of ABHISHEK discussed including CPAP or Bilevel  PAP.   Alternatives to PAP discussed if PAP not ultimately tolerated.  Risks of drowsy driving discussed.  Weight loss discussed if patient is overweight.            Assessment/Plan:       Problem List Items Addressed This Visit          Cardiac/Vascular    Primary hypertension       Other    Loud snoring - Primary    Suspected sleep apnea             Other comments/ follow up:          Orders Placed This Encounter   Procedures    Home Sleep Studies        _/_if PAP needed pt willing and interested in coming into the lab for gold standard technician attended first night PAP titration     Dominik Garcia MD

## 2022-09-13 ENCOUNTER — PROCEDURE VISIT (OUTPATIENT)
Dept: SLEEP MEDICINE | Facility: HOSPITAL | Age: 62
End: 2022-09-13
Attending: SPECIALIST
Payer: COMMERCIAL

## 2022-09-13 DIAGNOSIS — R06.83 LOUD SNORING: ICD-10-CM

## 2022-09-13 DIAGNOSIS — I10 PRIMARY HYPERTENSION: ICD-10-CM

## 2022-09-13 DIAGNOSIS — R29.818 SUSPECTED SLEEP APNEA: ICD-10-CM

## 2022-09-13 PROCEDURE — 95806 SLEEP STUDY UNATT&RESP EFFT: CPT | Mod: 26,,, | Performed by: SPECIALIST

## 2022-09-13 PROCEDURE — 95806 SLEEP STUDY UNATT&RESP EFFT: CPT

## 2022-09-13 PROCEDURE — 95806 PR SLEEP STUDY, UNATTENDED, SIMUL RECORD HR/O2 SAT/RESP FLOW/RESP EFFT: ICD-10-PCS | Mod: 26,,, | Performed by: SPECIALIST

## 2022-09-19 DIAGNOSIS — F32.A DEPRESSION, UNSPECIFIED DEPRESSION TYPE: ICD-10-CM

## 2022-09-19 DIAGNOSIS — I10 PRIMARY HYPERTENSION: Primary | ICD-10-CM

## 2022-09-19 DIAGNOSIS — T78.40XD ALLERGY, SUBSEQUENT ENCOUNTER: ICD-10-CM

## 2022-09-19 DIAGNOSIS — M19.90 ARTHRITIS: ICD-10-CM

## 2022-09-19 DIAGNOSIS — Z86.19 H/O COLD SORES: ICD-10-CM

## 2022-09-19 RX ORDER — ESCITALOPRAM OXALATE 10 MG/1
10 TABLET ORAL DAILY
Qty: 90 TABLET | Refills: 3 | Status: SHIPPED | OUTPATIENT
Start: 2022-09-19 | End: 2023-12-04

## 2022-09-19 RX ORDER — MONTELUKAST SODIUM 10 MG/1
10 TABLET ORAL DAILY
Qty: 90 TABLET | Refills: 3 | Status: SHIPPED | OUTPATIENT
Start: 2022-09-19 | End: 2023-06-05 | Stop reason: SDUPTHER

## 2022-09-19 RX ORDER — FLUTICASONE PROPIONATE 50 MCG
1 SPRAY, SUSPENSION (ML) NASAL 2 TIMES DAILY
Qty: 11.1 ML | Refills: 6 | Status: SHIPPED | OUTPATIENT
Start: 2022-09-19 | End: 2023-11-27 | Stop reason: SDUPTHER

## 2022-09-19 RX ORDER — IBUPROFEN AND FAMOTIDINE 800; 26.6 MG/1; MG/1
1 TABLET, COATED ORAL NIGHTLY
Qty: 90 TABLET | Refills: 3 | Status: SHIPPED | OUTPATIENT
Start: 2022-09-19 | End: 2022-09-23 | Stop reason: SDUPTHER

## 2022-09-19 RX ORDER — VALACYCLOVIR HYDROCHLORIDE 1 G/1
1000 TABLET, FILM COATED ORAL 3 TIMES DAILY PRN
Qty: 15 TABLET | Refills: 0 | Status: SHIPPED | OUTPATIENT
Start: 2022-09-19 | End: 2024-02-19 | Stop reason: DRUGHIGH

## 2022-09-19 RX ORDER — VALSARTAN 80 MG/1
80 TABLET ORAL DAILY
Qty: 90 TABLET | Refills: 3 | Status: SHIPPED | OUTPATIENT
Start: 2022-09-19 | End: 2023-05-17 | Stop reason: SDUPTHER

## 2022-09-19 RX ORDER — FLUTICASONE PROPIONATE 50 MCG
1 SPRAY, SUSPENSION (ML) NASAL 2 TIMES DAILY
COMMUNITY
Start: 2022-05-12 | End: 2022-09-19 | Stop reason: SDUPTHER

## 2022-09-23 ENCOUNTER — TELEPHONE (OUTPATIENT)
Dept: INTERNAL MEDICINE | Facility: CLINIC | Age: 62
End: 2022-09-23
Payer: COMMERCIAL

## 2022-09-23 DIAGNOSIS — M19.90 ARTHRITIS: ICD-10-CM

## 2022-09-23 RX ORDER — IBUPROFEN AND FAMOTIDINE 26.6; 8 MG/1; MG/1
1 TABLET ORAL NIGHTLY
Qty: 90 TABLET | Refills: 3 | Status: SHIPPED | OUTPATIENT
Start: 2022-09-23 | End: 2023-09-08

## 2022-09-23 NOTE — TELEPHONE ENCOUNTER
----- Message from Gretchen Simons sent at 9/23/2022  9:43 AM CDT -----  Regarding: call patient about script  Call patient at [phone] about her duexis 800-26.6 mg script

## 2022-09-29 ENCOUNTER — PROCEDURE VISIT (OUTPATIENT)
Dept: SLEEP MEDICINE | Facility: HOSPITAL | Age: 62
End: 2022-09-29
Attending: SPECIALIST
Payer: COMMERCIAL

## 2022-09-29 DIAGNOSIS — R06.83 LOUD SNORING: ICD-10-CM

## 2022-09-29 DIAGNOSIS — I10 PRIMARY HYPERTENSION: ICD-10-CM

## 2022-09-29 DIAGNOSIS — R29.818 SUSPECTED SLEEP APNEA: ICD-10-CM

## 2022-09-29 PROCEDURE — 95810 PR POLYSOMNOGRAPHY, 4 OR MORE: ICD-10-PCS | Mod: 26,,, | Performed by: SPECIALIST

## 2022-09-29 PROCEDURE — 95810 POLYSOM 6/> YRS 4/> PARAM: CPT

## 2022-09-29 PROCEDURE — 95810 POLYSOM 6/> YRS 4/> PARAM: CPT | Mod: 26,,, | Performed by: SPECIALIST

## 2022-10-05 ENCOUNTER — TELEPHONE (OUTPATIENT)
Dept: NEUROLOGY | Facility: CLINIC | Age: 62
End: 2022-10-05
Payer: COMMERCIAL

## 2022-10-06 NOTE — TELEPHONE ENCOUNTER
Discussed results of PSG with patient; clinically meaningful ABHISHEK not present on this night; PAP not warranted; no new orders

## 2022-10-11 DIAGNOSIS — F32.A DEPRESSION, UNSPECIFIED DEPRESSION TYPE: Primary | ICD-10-CM

## 2022-10-11 RX ORDER — ALPRAZOLAM 0.25 MG/1
0.25 TABLET ORAL DAILY PRN
Qty: 30 TABLET | Refills: 0 | Status: SHIPPED | OUTPATIENT
Start: 2022-10-11 | End: 2022-10-21 | Stop reason: SDUPTHER

## 2022-10-21 DIAGNOSIS — F32.A DEPRESSION, UNSPECIFIED DEPRESSION TYPE: ICD-10-CM

## 2022-10-21 RX ORDER — ALPRAZOLAM 0.25 MG/1
0.25 TABLET ORAL DAILY PRN
Qty: 30 TABLET | Refills: 0 | Status: SHIPPED | OUTPATIENT
Start: 2022-10-21 | End: 2022-11-09 | Stop reason: SDUPTHER

## 2022-10-21 NOTE — TELEPHONE ENCOUNTER
----- Message from May Murillo sent at 10/21/2022 10:48 AM CDT -----  Regarding: refill  MEDICATION REFILL REQUEST      PATIENT PHONE #:[phone]     :60     PHARMACY:[facility]     PHARMACY PHONE #: [phone]     ALLERGIES:     MESSAGE    Generic xanex .25       qd

## 2022-10-31 ENCOUNTER — TELEPHONE (OUTPATIENT)
Dept: INTERNAL MEDICINE | Facility: CLINIC | Age: 62
End: 2022-10-31
Payer: COMMERCIAL

## 2022-10-31 DIAGNOSIS — R07.89 LEFT-SIDED CHEST WALL PAIN: Primary | ICD-10-CM

## 2022-10-31 NOTE — TELEPHONE ENCOUNTER
----- Message from Gretchen Simons sent at 10/31/2022  8:50 AM CDT -----  Regarding: FW: fall    ----- Message -----  From: Tony Moore MD  Sent: 10/31/2022   8:43 AM CDT  To: Gretchen Simons  Subject: RE: fall                                          Send this to [facility]  ----- Message -----  From: Gretchen Simons  Sent: 10/31/2022   8:32 AM CDT  To: Tony Moore MD  Subject: fall                                             Patient left msg with answering service that she fell on left side and is in pain. Call her at [phone]

## 2022-10-31 NOTE — TELEPHONE ENCOUNTER
Fell 5 days ago, has surgery scheduled for Dec 8th on left breast to replace implant, fell on left side, pain noted with movement and coughing, please advise

## 2022-10-31 NOTE — TELEPHONE ENCOUNTER
She had a fall on left side having pain in her left ribcage with coughing   Will order chest x-ray and left rib views.

## 2022-11-01 ENCOUNTER — TELEPHONE (OUTPATIENT)
Dept: INTERNAL MEDICINE | Facility: CLINIC | Age: 62
End: 2022-11-01
Payer: COMMERCIAL

## 2022-11-01 ENCOUNTER — HOSPITAL ENCOUNTER (OUTPATIENT)
Dept: RADIOLOGY | Facility: HOSPITAL | Age: 62
Discharge: HOME | End: 2022-11-01
Attending: INTERNAL MEDICINE
Payer: COMMERCIAL

## 2022-11-01 DIAGNOSIS — R07.89 LEFT-SIDED CHEST WALL PAIN: ICD-10-CM

## 2022-11-01 PROCEDURE — 71100 X-RAY EXAM RIBS UNI 2 VIEWS: CPT | Mod: TC,LT

## 2022-11-01 PROCEDURE — 71046 X-RAY EXAM CHEST 2 VIEWS: CPT | Mod: TC

## 2022-11-03 ENCOUNTER — TELEPHONE (OUTPATIENT)
Dept: ADMINISTRATIVE | Facility: HOSPITAL | Age: 62
End: 2022-11-03
Payer: COMMERCIAL

## 2022-11-08 PROBLEM — R29.818 SUSPECTED SLEEP APNEA: Chronic | Status: ACTIVE | Noted: 2022-09-08

## 2022-11-08 PROBLEM — I10 PRIMARY HYPERTENSION: Chronic | Status: ACTIVE | Noted: 2022-09-08

## 2022-11-09 ENCOUNTER — OFFICE VISIT (OUTPATIENT)
Dept: INTERNAL MEDICINE | Facility: CLINIC | Age: 62
End: 2022-11-09
Payer: COMMERCIAL

## 2022-11-09 VITALS
DIASTOLIC BLOOD PRESSURE: 78 MMHG | BODY MASS INDEX: 24.84 KG/M2 | HEIGHT: 63 IN | SYSTOLIC BLOOD PRESSURE: 128 MMHG | OXYGEN SATURATION: 97 % | HEART RATE: 81 BPM | WEIGHT: 140.19 LBS

## 2022-11-09 DIAGNOSIS — Z00.00 WELLNESS EXAMINATION: Primary | ICD-10-CM

## 2022-11-09 DIAGNOSIS — M65.30 TRIGGER FINGER, UNSPECIFIED FINGER, UNSPECIFIED LATERALITY: ICD-10-CM

## 2022-11-09 DIAGNOSIS — F32.A DEPRESSION, UNSPECIFIED DEPRESSION TYPE: ICD-10-CM

## 2022-11-09 PROCEDURE — 99396 PR PREVENTIVE VISIT,EST,40-64: ICD-10-PCS | Mod: ,,, | Performed by: INTERNAL MEDICINE

## 2022-11-09 PROCEDURE — 99396 PREV VISIT EST AGE 40-64: CPT | Mod: ,,, | Performed by: INTERNAL MEDICINE

## 2022-11-09 RX ORDER — ALPRAZOLAM 0.25 MG/1
0.25 TABLET ORAL DAILY PRN
Qty: 90 TABLET | Refills: 1 | Status: SHIPPED | OUTPATIENT
Start: 2022-11-09 | End: 2023-02-23 | Stop reason: SDUPTHER

## 2022-11-09 NOTE — PROGRESS NOTES
Tony Moore MD        PATIENT NAME: Zandra Levy  : 1960  DATE: 22  MRN: [id number]      Billing Provider: Tony Moore MD  Level of Service: AR PREVENTIVE VISIT,EST,40-64  Patient PCP Information       Provider PCP Type    Tony Moore MD General            Reason for Visit / Chief Complaint: Annual Exam (Wellness )       Update PCP  Update Chief Complaint         History of Present Illness / Problem Focused Workflow     Zandra Levy presents to the clinic with Annual Exam (Wellness )     Patient is here for annual wellness she is doing well medically her only issues her left hand which she is having problems which she has seen a few doctors for this.      Review of Systems   Review of Systems   Constitutional: Negative.    HENT: Negative.     Eyes: Negative.    Respiratory: Negative.     Cardiovascular: Negative.    Gastrointestinal: Negative.    Endocrine: Negative.    Genitourinary: Negative.    Musculoskeletal: Negative.    Integumentary:  Negative.   Neurological: Negative.    Psychiatric/Behavioral: Negative.        Medical / Social / Family History     Past Medical History:   Diagnosis Date    Anxiety disorder, unspecified     GERD (gastroesophageal reflux disease)     Herpes simplex     Osteopenia     Vitamin D deficiency        Past Surgical History:   Procedure Laterality Date    BREAST SURGERY      x 3 2022    DENTAL SURGERY      HAND SURGERY      3 years ago    ROTATOR CUFF REPAIR         Social History  Ms. Levy  reports that she has quit smoking. Her smoking use included cigarettes. She has never used smokeless tobacco. She reports current alcohol use. She reports that she does not use drugs.    Family History  Ms.'s Levy  family history is not on file.    Medications and Allergies     Medications  Outpatient Medications Marked as Taking for the 22 encounter (Office Visit) with Tony Moore MD   Medication Sig Dispense Refill     ALPRAZolam (XANAX) 0.25 MG tablet Take 1 tablet (0.25 mg total) by mouth daily as needed for Insomnia or Anxiety. 30 tablet 0    cholecalciferol, vitamin D3, (VITAMIN D3) 25 mcg (1,000 unit) capsule Take 1,000 Units by mouth once daily.      EScitalopram oxalate (LEXAPRO) 10 MG tablet Take 1 tablet (10 mg total) by mouth once daily. 90 tablet 3    fluticasone propionate (FLONASE) 50 mcg/actuation nasal spray 1 spray (50 mcg total) by Each Nostril route 2 (two) times daily. 11.1 mL 6    ibuprofen-famotidine (DUEXIS) 800-26.6 mg Tab Take 1 tablet by mouth nightly. 90 tablet 3    melatonin 5 mg Cap Take by mouth.      montelukast (SINGULAIR) 10 mg tablet Take 1 tablet (10 mg total) by mouth once daily. 90 tablet 3    valACYclovir (VALTREX) 1000 MG tablet Take 1 tablet (1,000 mg total) by mouth 3 (three) times daily as needed (as need for outbreak). 15 tablet 0    valsartan (DIOVAN) 80 MG tablet Take 1 tablet (80 mg total) by mouth once daily. 90 tablet 3    [DISCONTINUED] FAMOTIDINE ORAL Take 26.6 mg by mouth daily as needed.         Allergies  Review of patient's allergies indicates:   Allergen Reactions    Penicillins Shortness Of Breath    Codeine Hives       Physical Examination     Vitals:    11/09/22 0931   BP: 128/78   Pulse: 81     Physical Exam  Constitutional:       Appearance: Normal appearance.   HENT:      Head: Normocephalic and atraumatic.      Right Ear: Tympanic membrane normal.      Left Ear: Tympanic membrane normal.      Nose: Nose normal.      Mouth/Throat:      Mouth: Mucous membranes are moist.   Eyes:      Extraocular Movements: Extraocular movements intact.      Pupils: Pupils are equal, round, and reactive to light.   Cardiovascular:      Rate and Rhythm: Normal rate and regular rhythm.      Pulses: Normal pulses.   Pulmonary:      Effort: Pulmonary effort is normal.      Breath sounds: Normal breath sounds.   Abdominal:      General: Abdomen is flat. Bowel sounds are normal.      Palpations:  Abdomen is soft.   Musculoskeletal:         General: Normal range of motion.      Cervical back: Normal range of motion and neck supple.   Skin:     General: Skin is warm and dry.   Neurological:      General: No focal deficit present.      Mental Status: She is alert and oriented to person, place, and time.   Psychiatric:         Mood and Affect: Mood normal.         Behavior: Behavior normal.        Assessment and Plan (including Health Maintenance)      Problem List  Smart Sets  Document Outside HM   :    Plan:   Wellness examination    Depression, unspecified depression type    Trigger finger, unspecified finger, unspecified laterality     Discussed her lab results all normal   Referral made to orthopedics for her hand she wants a referral to Cardiology  Revisit 1 year annual wellness         Health Maintenance Due   Topic Date Due    Cervical Cancer Screening  Never done    HIV Screening  Never done    TETANUS VACCINE  Never done    Mammogram  Never done    Colorectal Cancer Screening  Never done       Problem List Items Addressed This Visit    None  Visit Diagnoses       Wellness examination    -  Primary    Depression, unspecified depression type        Trigger finger, unspecified finger, unspecified laterality                Health Maintenance Topics with due status: Not Due       Topic Last Completion Date    Lipid Panel 11/01/2022       No future appointments.         Signature:  Tony Sims MD  [facility]  [address] [place] [address]  [place] [id number]    Date of encounter: 11/9/22

## 2022-11-16 ENCOUNTER — OFFICE VISIT (OUTPATIENT)
Dept: ORTHOPEDICS | Facility: CLINIC | Age: 62
End: 2022-11-16
Payer: COMMERCIAL

## 2022-11-16 VITALS — HEIGHT: 63 IN | BODY MASS INDEX: 24.8 KG/M2 | WEIGHT: 140 LBS

## 2022-11-16 DIAGNOSIS — S64.40XA INJURY OF DIGITAL NERVE OF FINGER OF RIGHT HAND: ICD-10-CM

## 2022-11-16 DIAGNOSIS — G56.01 CARPAL TUNNEL SYNDROME OF RIGHT WRIST: Primary | ICD-10-CM

## 2022-11-16 PROCEDURE — 99203 PR OFFICE/OUTPT VISIT, NEW, LEVL III, 30-44 MIN: ICD-10-PCS | Mod: 25,,, | Performed by: ORTHOPAEDIC SURGERY

## 2022-11-16 PROCEDURE — 99203 OFFICE O/P NEW LOW 30 MIN: CPT | Mod: 25,,, | Performed by: ORTHOPAEDIC SURGERY

## 2022-11-16 PROCEDURE — 20526 CARPAL TUNNEL: ICD-10-PCS | Mod: RT,,, | Performed by: ORTHOPAEDIC SURGERY

## 2022-11-16 PROCEDURE — 20526 THER INJECTION CARP TUNNEL: CPT | Mod: RT,,, | Performed by: ORTHOPAEDIC SURGERY

## 2022-11-16 RX ORDER — BETAMETHASONE SODIUM PHOSPHATE AND BETAMETHASONE ACETATE 3; 3 MG/ML; MG/ML
3 INJECTION, SUSPENSION INTRA-ARTICULAR; INTRALESIONAL; INTRAMUSCULAR; SOFT TISSUE
Status: DISCONTINUED | OUTPATIENT
Start: 2022-11-16 | End: 2022-11-16 | Stop reason: HOSPADM

## 2022-11-16 RX ORDER — LIDOCAINE HYDROCHLORIDE 20 MG/ML
2 INJECTION, SOLUTION INFILTRATION; PERINEURAL
Status: DISCONTINUED | OUTPATIENT
Start: 2022-11-16 | End: 2022-11-16 | Stop reason: HOSPADM

## 2022-11-16 RX ADMIN — LIDOCAINE HYDROCHLORIDE 2 ML: 20 INJECTION, SOLUTION INFILTRATION; PERINEURAL at 01:11

## 2022-11-16 RX ADMIN — BETAMETHASONE SODIUM PHOSPHATE AND BETAMETHASONE ACETATE 3 MG: 3; 3 INJECTION, SUSPENSION INTRA-ARTICULAR; INTRALESIONAL; INTRAMUSCULAR; SOFT TISSUE at 01:11

## 2022-11-16 NOTE — PROGRESS NOTES
Chief Complaint:   Chief Complaint   Patient presents with    Right Hand - Pain    Hand Pain     patient has been seen for hand since 2014 for trigger finger for multiple fingers, has had multiple injections and surgeries on her fingers, here to get a second opinion about what is the  real issue, unable to obtain vitals       Consulting Physician: Tony Moore MD    History of present illness:    she  is a pleasant 62 y.o. year old female with numbness, tingling, and pain to her ring finger.  It is located on the radial side of the ring finger.  She is a history of a trigger finger release in the past.  She also complains of numbness and tingling of the entire hand that awakens her at night.  She also occasionally has pain at the elbow that causes pain down the hand.  She is status post multiple EMGs which showed injury to the digital nerve as well as carpal tunnel syndrome and cubital tunnel syndrome.  She is had no evidence of radiculopathy.    Past Medical History:   Diagnosis Date    Anxiety disorder, unspecified     GERD (gastroesophageal reflux disease)     Herpes simplex     Osteopenia     Vitamin D deficiency        Past Surgical History:   Procedure Laterality Date    BREAST SURGERY      x 3 06/04/2022    DENTAL SURGERY      HAND SURGERY      3 years ago    ROTATOR CUFF REPAIR         Current Outpatient Medications   Medication Sig    ALPRAZolam (XANAX) 0.25 MG tablet Take 1 tablet (0.25 mg total) by mouth daily as needed for Insomnia or Anxiety.    cholecalciferol, vitamin D3, (VITAMIN D3) 25 mcg (1,000 unit) capsule Take 1,000 Units by mouth once daily.    EScitalopram oxalate (LEXAPRO) 10 MG tablet Take 1 tablet (10 mg total) by mouth once daily.    fluticasone propionate (FLONASE) 50 mcg/actuation nasal spray 1 spray (50 mcg total) by Each Nostril route 2 (two) times daily.    ibuprofen-famotidine (DUEXIS) 800-26.6 mg Tab Take 1 tablet by mouth nightly.    melatonin 5 mg Cap Take by mouth.     "montelukast (SINGULAIR) 10 mg tablet Take 1 tablet (10 mg total) by mouth once daily.    valsartan (DIOVAN) 80 MG tablet Take 1 tablet (80 mg total) by mouth once daily.    valACYclovir (VALTREX) 1000 MG tablet Take 1 tablet (1,000 mg total) by mouth 3 (three) times daily as needed (as need for outbreak).     No current facility-administered medications for this visit.       Review of patient's allergies indicates:   Allergen Reactions    Penicillins Shortness Of Breath    Codeine Hives       Family History   Problem Relation Age of Onset    Diabetes Mother     Cancer Father     Cancer Sister     Heart attack Sister     Heart attack Brother     Diabetes Brother        Social History     Socioeconomic History    Marital status:    Tobacco Use    Smoking status: Former     Types: Cigarettes    Smokeless tobacco: Never   Substance and Sexual Activity    Alcohol use: Yes    Drug use: Never    Sexual activity: Yes       Review of Systems:    Constitution:   Denies chills, fever, and sweats.  HENT:   Denies headaches or blurry vision.  Cardiovascular:  Denies chest pain or irregular heart beat.  Respiratory:   Denies cough or shortness of breath.  Gastrointestinal:  Denies abdominal pain, nausea, or vomiting.  Musculoskeletal:   Denies muscle cramps.  Neurological:   Denies dizziness or focal weakness.  Psychiatric/Behavior: Normal mental status.  Hematology/Lymph:  Denies bleeding problem or easy bruising/bleeding.  Skin:    Denies rash or suspicious lesions.    Examination:    Vital Signs:    Vitals:    11/16/22 1315   Weight: 63.5 kg (140 lb)   Height: 5' 3" (1.6 m)   PainSc:   3       Body mass index is 24.8 kg/m².    Constitution:   Well-developed, well nourished patient in no acute distress.  Neurological:   Alert and oriented x 3 and cooperative to examination.     Psychiatric/Behavior: Normal mental status.  Respiratory:   No shortness of breath.  Eyes:    Extraoccular muscles intact  Skin:    No scars, " rash or suspicious lesions.    MSK:   On exam she has normal-appearing hand she does have some mild atrophy of her thenar eminence.  She has healed trigger finger incisions of both the ring finger and long finger.  She is full range of motion of her fingers and wrist.  She does have a Tinel's at the cubital tunnel.  She has a positive carpal tunnel compression test.       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  I will see her back in 4 weeks for recheck

## 2022-11-16 NOTE — PROCEDURES
Carpal Tunnel    Date/Time: 11/16/2022 1:00 PM  Performed by: Khoi Shrestha Jr., MD  Authorized by: Khoi Shrestha Jr., MD     Consent Done?:  Yes (Verbal)  Indications:  Pain and diagnostic evaluation  Site marked: the procedure site was marked    Timeout: prior to procedure the correct patient, procedure, and site was verified    Prep: patient was prepped and draped in usual sterile fashion      Local anesthesia used?: Yes    Local anesthetic:  Topical anesthetic  Location:  Wrist  Site:  R carpal tunnel  Medications:  2 mL LIDOcaine HCL 20 mg/ml (2%) 20 mg/mL (2 %); 3 mg betamethasone acetate-betamethasone sodium phosphate 6 mg/mL

## 2022-12-07 ENCOUNTER — TELEPHONE (OUTPATIENT)
Dept: INTERNAL MEDICINE | Facility: CLINIC | Age: 62
End: 2022-12-07
Payer: COMMERCIAL

## 2022-12-07 NOTE — TELEPHONE ENCOUNTER
"Phone call received from [facility] script this AM about Duexis not being covered by Pt insurance, if medication was  it would be covered, I call Pt to let them know she stated she did not want to take 2 pills, I advised her that if she gave them a call to sent a PA I would fill it out online for her medication, she stated ok, I received a message from the call center from the Pt stating she has been waiting 5 hours for a return call from myself, At the end of our last conservation I was awaiting a PA form, Pt began to yell on the phone as I said maa'am multiple times, she stated " your are going to listen to me" I advised the Pt that if she lowered her tone I could understand what she was saying, she then stated :you know what" and hung up in my face   "

## 2022-12-07 NOTE — TELEPHONE ENCOUNTER
"Note  Phone call received from [facility] this AM about Duexis not being covered by Pt insurance, if medication was  it would be covered, I call Pt to let them know she stated she did not want to take 2 pills, I advised her that if she gave them a call to sent a PA I would fill it out online for her medication, she stated ok, I received a message from the call center from the Pt stating she has been waiting 5 hours for a return call from myself, At the end of our last conservation I was awaiting a PA form, Pt began to yell on the phone as I said maa'am multiple times, she stated " your are going to listen to me" I advised the Pt that if she lowered her tone I could understand what she was saying, she then stated :you know what" and hung up in my face         "

## 2022-12-07 NOTE — TELEPHONE ENCOUNTER
----- Message from Gretchen Simons sent at 12/7/2022  2:05 PM CST -----  Regarding: call patient back  Patient says you need to call nini at [phone]. She spoke to you earlier. You can call her at [phone]

## 2022-12-10 ENCOUNTER — DOCUMENTATION ONLY (OUTPATIENT)
Dept: INTERNAL MEDICINE | Facility: CLINIC | Age: 62
End: 2022-12-10
Payer: COMMERCIAL

## 2022-12-12 ENCOUNTER — TELEPHONE (OUTPATIENT)
Dept: INTERNAL MEDICINE | Facility: CLINIC | Age: 62
End: 2022-12-12
Payer: COMMERCIAL

## 2022-12-28 ENCOUNTER — OFFICE VISIT (OUTPATIENT)
Dept: ORTHOPEDICS | Facility: CLINIC | Age: 62
End: 2022-12-28
Payer: COMMERCIAL

## 2022-12-28 VITALS — WEIGHT: 140 LBS | BODY MASS INDEX: 24.8 KG/M2 | HEIGHT: 63 IN

## 2022-12-28 DIAGNOSIS — G56.01 CARPAL TUNNEL SYNDROME OF RIGHT WRIST: Primary | ICD-10-CM

## 2022-12-28 DIAGNOSIS — S64.40XA INJURY OF DIGITAL NERVE OF FINGER OF RIGHT HAND: ICD-10-CM

## 2022-12-28 PROCEDURE — 99213 OFFICE O/P EST LOW 20 MIN: CPT | Mod: ,,, | Performed by: ORTHOPAEDIC SURGERY

## 2022-12-28 PROCEDURE — 99213 PR OFFICE/OUTPT VISIT, EST, LEVL III, 20-29 MIN: ICD-10-PCS | Mod: ,,, | Performed by: ORTHOPAEDIC SURGERY

## 2022-12-28 RX ORDER — DOXYCYCLINE HYCLATE 100 MG
100 TABLET ORAL 2 TIMES DAILY
COMMUNITY
Start: 2022-12-21 | End: 2023-02-23

## 2022-12-28 NOTE — PROGRESS NOTES
Chief Complaint:   Chief Complaint   Patient presents with    Right Hand - Follow-up    Hand Pain     R hand ring and little trigger finger, had inj on 11/16/22, reports numbness, not as much falling asleep of arm,        Consulting Physician: No ref. provider found    History of present illness:    she  is a pleasant 62 y.o. year old female with numbness, tingling, and pain to her ring finger.  It is located on the radial side of the ring finger.  She is a history of a trigger finger release in the past.  She also complains of numbness and tingling of the entire hand that awakens her at night.  She also occasionally has pain at the elbow that causes pain down the hand.  She is status post multiple EMGs which showed injury to the digital nerve as well as carpal tunnel syndrome and cubital tunnel syndrome.  She is had no evidence of radiculopathy.    She returns today.  We tried a carpal tunnel injection in November of 2022 which actually helps some of her pain in the hand and arm.    Past Medical History:   Diagnosis Date    Anxiety disorder, unspecified     GERD (gastroesophageal reflux disease)     Herpes simplex     Osteopenia     Vitamin D deficiency        Past Surgical History:   Procedure Laterality Date    BREAST SURGERY      x 3 06/04/2022    DENTAL SURGERY      HAND SURGERY      3 years ago    ROTATOR CUFF REPAIR         Current Outpatient Medications   Medication Sig    ALPRAZolam (XANAX) 0.25 MG tablet Take 1 tablet (0.25 mg total) by mouth daily as needed for Insomnia or Anxiety.    valACYclovir (VALTREX) 1000 MG tablet Take 1 tablet (1,000 mg total) by mouth 3 (three) times daily as needed (as need for outbreak).    cholecalciferol, vitamin D3, (VITAMIN D3) 25 mcg (1,000 unit) capsule Take 1,000 Units by mouth once daily.    doxycycline (VIBRA-TABS) 100 MG tablet Take 100 mg by mouth 2 (two) times daily.    EScitalopram oxalate (LEXAPRO) 10 MG tablet Take 1 tablet (10 mg total) by mouth once daily.     "fluticasone propionate (FLONASE) 50 mcg/actuation nasal spray 1 spray (50 mcg total) by Each Nostril route 2 (two) times daily.    ibuprofen-famotidine (DUEXIS) 800-26.6 mg Tab Take 1 tablet by mouth nightly.    melatonin 5 mg Cap Take by mouth.    montelukast (SINGULAIR) 10 mg tablet Take 1 tablet (10 mg total) by mouth once daily.    valsartan (DIOVAN) 80 MG tablet Take 1 tablet (80 mg total) by mouth once daily.     No current facility-administered medications for this visit.       Review of patient's allergies indicates:   Allergen Reactions    Penicillins Shortness Of Breath    Codeine Hives       Family History   Problem Relation Age of Onset    Diabetes Mother     Cancer Father     Cancer Sister     Heart attack Sister     Heart attack Brother     Diabetes Brother        Social History     Socioeconomic History    Marital status:    Tobacco Use    Smoking status: Former     Types: Cigarettes    Smokeless tobacco: Never   Substance and Sexual Activity    Alcohol use: Yes    Drug use: Never    Sexual activity: Yes       Review of Systems:    Constitution:   Denies chills, fever, and sweats.  HENT:   Denies headaches or blurry vision.  Cardiovascular:  Denies chest pain or irregular heart beat.  Respiratory:   Denies cough or shortness of breath.  Gastrointestinal:  Denies abdominal pain, nausea, or vomiting.  Musculoskeletal:   Denies muscle cramps.  Neurological:   Denies dizziness or focal weakness.  Psychiatric/Behavior: Normal mental status.  Hematology/Lymph:  Denies bleeding problem or easy bruising/bleeding.  Skin:    Denies rash or suspicious lesions.    Examination:    Vital Signs:    Vitals:    12/28/22 1016 12/28/22 1017   Weight: 63.5 kg (140 lb)    Height: 5' 3" (1.6 m)    PainSc:    5       Body mass index is 24.8 kg/m².    Constitution:   Well-developed, well nourished patient in no acute distress.  Neurological:   Alert and oriented x 3 and cooperative to examination.   "   Psychiatric/Behavior: Normal mental status.  Respiratory:   No shortness of breath.  Eyes:    Extraoccular muscles intact  Skin:    No scars, rash or suspicious lesions.    MSK:   On exam she has normal-appearing hand she does have some mild atrophy of her thenar eminence.  She has healed trigger finger incisions of both the ring finger and long finger.  She is full range of motion of her fingers and wrist.  She does have a Tinel's at the cubital tunnel.  She has a positive carpal tunnel compression test.       Assessment: Carpal tunnel syndrome of right wrist    Injury of digital nerve of finger of right hand        Plan:  I am going to refer her over to Dr. Richards for digital nerve exploration, carpal tunnel release, and possibly cubital tunnel release

## 2023-01-04 ENCOUNTER — HOSPITAL ENCOUNTER (OUTPATIENT)
Dept: RADIOLOGY | Facility: CLINIC | Age: 63
Discharge: HOME | End: 2023-01-04
Attending: STUDENT IN AN ORGANIZED HEALTH CARE EDUCATION/TRAINING PROGRAM
Payer: COMMERCIAL

## 2023-01-04 ENCOUNTER — OFFICE VISIT (OUTPATIENT)
Dept: ORTHOPEDICS | Facility: CLINIC | Age: 63
End: 2023-01-04
Payer: COMMERCIAL

## 2023-01-04 ENCOUNTER — HOSPITAL ENCOUNTER (OUTPATIENT)
Dept: RADIOLOGY | Facility: HOSPITAL | Age: 63
Discharge: HOME | End: 2023-01-04
Attending: STUDENT IN AN ORGANIZED HEALTH CARE EDUCATION/TRAINING PROGRAM
Payer: COMMERCIAL

## 2023-01-04 VITALS — SYSTOLIC BLOOD PRESSURE: 168 MMHG | DIASTOLIC BLOOD PRESSURE: 97 MMHG | HEART RATE: 89 BPM

## 2023-01-04 DIAGNOSIS — G56.21 CUBITAL TUNNEL SYNDROME ON RIGHT: ICD-10-CM

## 2023-01-04 DIAGNOSIS — S64.494S: ICD-10-CM

## 2023-01-04 DIAGNOSIS — G56.01 CARPAL TUNNEL SYNDROME OF RIGHT WRIST: ICD-10-CM

## 2023-01-04 DIAGNOSIS — G56.01 CARPAL TUNNEL SYNDROME OF RIGHT WRIST: Primary | ICD-10-CM

## 2023-01-04 PROCEDURE — 73130 XR HAND COMPLETE 3 VIEW RIGHT: ICD-10-PCS | Mod: RT,,, | Performed by: STUDENT IN AN ORGANIZED HEALTH CARE EDUCATION/TRAINING PROGRAM

## 2023-01-04 PROCEDURE — 73080 X-RAY EXAM OF ELBOW: CPT | Mod: RT,,, | Performed by: STUDENT IN AN ORGANIZED HEALTH CARE EDUCATION/TRAINING PROGRAM

## 2023-01-04 PROCEDURE — 73130 X-RAY EXAM OF HAND: CPT | Mod: RT,,, | Performed by: STUDENT IN AN ORGANIZED HEALTH CARE EDUCATION/TRAINING PROGRAM

## 2023-01-04 PROCEDURE — 71046 X-RAY EXAM CHEST 2 VIEWS: CPT | Mod: TC

## 2023-01-04 PROCEDURE — 99204 OFFICE O/P NEW MOD 45 MIN: CPT | Mod: ,,, | Performed by: STUDENT IN AN ORGANIZED HEALTH CARE EDUCATION/TRAINING PROGRAM

## 2023-01-04 PROCEDURE — 73080 XR ELBOW COMPLETE 3 VIEW RIGHT: ICD-10-PCS | Mod: RT,,, | Performed by: STUDENT IN AN ORGANIZED HEALTH CARE EDUCATION/TRAINING PROGRAM

## 2023-01-04 PROCEDURE — 99204 PR OFFICE/OUTPT VISIT, NEW, LEVL IV, 45-59 MIN: ICD-10-PCS | Mod: ,,, | Performed by: STUDENT IN AN ORGANIZED HEALTH CARE EDUCATION/TRAINING PROGRAM

## 2023-01-04 RX ORDER — SODIUM CHLORIDE 9 MG/ML
INJECTION, SOLUTION INTRAVENOUS CONTINUOUS
Status: CANCELLED | OUTPATIENT
Start: 2023-01-04

## 2023-01-04 RX ORDER — FAMOTIDINE 20 MG/1
20 TABLET, FILM COATED ORAL 2 TIMES DAILY PRN
COMMUNITY
End: 2023-02-23

## 2023-01-05 ENCOUNTER — TELEPHONE (OUTPATIENT)
Dept: ORTHOPEDICS | Facility: CLINIC | Age: 63
End: 2023-01-05
Payer: COMMERCIAL

## 2023-01-05 NOTE — H&P (VIEW-ONLY)
Chief Complaint:  Right hand numbness with pain at the base of the right ring finger    Consulting Physician: No ref. provider found    History of present illness:    Patient is a 62-year-old female who presents for initial evaluation of her right hand numbness and tingling.  She had a ring finger trigger finger release performed in 2019 after which the radial side of her ring finger became completely numb.  She also had multiple trigger fingers that were released in the past but the other ones did okay.  She developed numbness and tingling in all 5 digits.  She saw 1 of my sports partners who gave her a carpal tunnel injection which temporarily gave her some relief but her numbness returned.  She tried nighttime splinting and conservative treatment.  She had a EMG performed which shows that she does have carpal tunnel syndrome as well as a digital nerve injury to the radial aspect of the ring finger.  She is also complaining of numbness in the small finger.  The tingling sensation she describes his different than the sensation she describes on the radial aspect of the ring finger.  On the radial aspect of the ring finger there was completely absent sensation.  In the other fingers there is diminished sensation but it is still present.    Past Medical History:   Diagnosis Date    Anxiety disorder, unspecified     Fractures     left femur    GERD (gastroesophageal reflux disease)     Herpes simplex     Osteopenia     Vitamin D deficiency        Past Surgical History:   Procedure Laterality Date    BREAST SURGERY      x 3 06/04/2022    BREAST SURGERY      12/08/22    DENTAL SURGERY      HAND SURGERY      3 years ago    ROTATOR CUFF REPAIR         Current Outpatient Medications   Medication Sig    ALPRAZolam (XANAX) 0.25 MG tablet Take 1 tablet (0.25 mg total) by mouth daily as needed for Insomnia or Anxiety.    cholecalciferol, vitamin D3, (VITAMIN D3) 25 mcg (1,000 unit) capsule Take 1,000 Units by mouth once daily.     doxycycline (VIBRA-TABS) 100 MG tablet Take 100 mg by mouth 2 (two) times daily.    EScitalopram oxalate (LEXAPRO) 10 MG tablet Take 1 tablet (10 mg total) by mouth once daily.    famotidine (PEPCID) 20 MG tablet Take 20 mg by mouth 2 (two) times daily.    fluticasone propionate (FLONASE) 50 mcg/actuation nasal spray 1 spray (50 mcg total) by Each Nostril route 2 (two) times daily.    ibuprofen-famotidine (DUEXIS) 800-26.6 mg Tab Take 1 tablet by mouth nightly.    melatonin 5 mg Cap Take by mouth.    montelukast (SINGULAIR) 10 mg tablet Take 1 tablet (10 mg total) by mouth once daily.    valACYclovir (VALTREX) 1000 MG tablet Take 1 tablet (1,000 mg total) by mouth 3 (three) times daily as needed (as need for outbreak).    valsartan (DIOVAN) 80 MG tablet Take 1 tablet (80 mg total) by mouth once daily.     No current facility-administered medications for this visit.       Review of patient's allergies indicates:   Allergen Reactions    Penicillins Shortness Of Breath    Amoxicillin Diarrhea     Per patient     Codeine Hives       Family History   Problem Relation Age of Onset    Diabetes Mother     Cancer Father     Cancer Sister     Heart attack Sister     Heart attack Brother     Diabetes Brother        Social History     Socioeconomic History    Marital status:    Tobacco Use    Smoking status: Former     Types: Cigarettes     Quit date:      Years since quittin.0    Smokeless tobacco: Never   Substance and Sexual Activity    Alcohol use: Yes     Alcohol/week: 2.0 standard drinks     Types: 2 Cans of beer per week     Comment: Social    Drug use: Yes     Types: Marijuana     Comment: Per patient medicial marijuana    Sexual activity: Yes       Review of Systems:    Constitution:   Denies chills, fever, and sweats.  HENT:   Denies headaches or blurry vision.  Cardiovascular:  Denies chest pain or irregular heart beat.  Respiratory:   Denies cough or shortness of breath.  Gastrointestinal:  Denies  abdominal pain, nausea, or vomiting.  Musculoskeletal:   Denies muscle cramps.  Neurological:   Denies dizziness or focal weakness.  Psychiatric/Behavior: Normal mental status.  Hematology/Lymph:  Denies bleeding problem or easy bruising/bleeding.  Skin:    Denies rash or suspicious lesions.    Examination:    Vital Signs:    Vitals:    01/04/23 1453   BP: (!) 168/97   Pulse: 89   PainSc:   8       There is no height or weight on file to calculate BMI.    Constitution:   Well-developed, well nourished patient in no acute distress.  Neurological:   Alert and oriented x 3 and cooperative to examination.     Psychiatric/Behavior: Normal mental status.  Respiratory:   No shortness of breath.  Eyes:    Extraoccular muscles intact  Skin:    No scars, rash or suspicious lesions.    MSK:   Right upper extremity:  Surgical incisions over the A1 pulleys of index, middle, ring, small finger are healed without any sign of infection.  There is no tenderness to palpation over the A1 pulley although there is a Tinel sign in the webspace between the 3rd and 4th digit.  She also has a positive Tinel at the median nerve at the wrist as well as a positive Tinel at the ulnar nerve at the elbow.  Flexion and compression at the elbow reproduces numbness in the ring and small finger.  Phalen's and Durkan's tests are positive.  In the median nerve distribution she has 20% sensation.  In the ulnar nerve distribution she has 10% sensation with completely absent sensation on the radial aspect of the ring finger.  She is able to make a fist and extend her digits fully.  Radial    Imaging:   X-ray of the right elbow shows no fracture dislocations  X-ray of the right hand shows no fracture dislocations     Assessment:  Right carpal tunnel syndrome, right cubital tunnel syndrome, right radial digital nerve to the ring finger injury    Plan:  She is tried conservative treatment for these.  She would like to have her carpal tunnel and cubital  tunnel released.  She would like to also have her radial digital nerve to the ring finger explored.  I can book her for a right endoscopic cubital tunnel release, carpal tunnel release, radial digital nerve to the ring finger exploration.  I explained that if I find a neuroma of the radial digital nerve I will excise it reconstruct it with allograft.  I will book her for Friday January 27    I explained that surgery and the nature of their condition are not without risks. These include, but are not limited to, bleeding, infection, neurovascular compromise, wound complications, scarring, cosmetic defects, need for later and/or repeated surgeries, pain, loss of ROM, loss of function, deformity, functional abnormalities, stiffness, thromboembolic complications, compartment syndrome, loss of limb, loss of life, anesthetic complications, and other imponderables. I explained that these can occur despite the adequacy of treatments rendered, and that their risks are heightened given the nature of their condition. They verbalized understanding. They would like to continue with surgery at this time. If appropriate family was involved with surgical discussion.     Follow Up:  After surgery  Xray at next visit:  None

## 2023-01-05 NOTE — PROGRESS NOTES
Chief Complaint:  Right hand numbness with pain at the base of the right ring finger    Consulting Physician: No ref. provider found    History of present illness:    Patient is a 62-year-old female who presents for initial evaluation of her right hand numbness and tingling.  She had a ring finger trigger finger release performed in 2019 after which the radial side of her ring finger became completely numb.  She also had multiple trigger fingers that were released in the past but the other ones did okay.  She developed numbness and tingling in all 5 digits.  She saw 1 of my sports partners who gave her a carpal tunnel injection which temporarily gave her some relief but her numbness returned.  She tried nighttime splinting and conservative treatment.  She had a EMG performed which shows that she does have carpal tunnel syndrome as well as a digital nerve injury to the radial aspect of the ring finger.  She is also complaining of numbness in the small finger.  The tingling sensation she describes his different than the sensation she describes on the radial aspect of the ring finger.  On the radial aspect of the ring finger there was completely absent sensation.  In the other fingers there is diminished sensation but it is still present.    Past Medical History:   Diagnosis Date    Anxiety disorder, unspecified     Fractures     left femur    GERD (gastroesophageal reflux disease)     Herpes simplex     Osteopenia     Vitamin D deficiency        Past Surgical History:   Procedure Laterality Date    BREAST SURGERY      x 3 06/04/2022    BREAST SURGERY      12/08/22    DENTAL SURGERY      HAND SURGERY      3 years ago    ROTATOR CUFF REPAIR         Current Outpatient Medications   Medication Sig    ALPRAZolam (XANAX) 0.25 MG tablet Take 1 tablet (0.25 mg total) by mouth daily as needed for Insomnia or Anxiety.    cholecalciferol, vitamin D3, (VITAMIN D3) 25 mcg (1,000 unit) capsule Take 1,000 Units by mouth once daily.     doxycycline (VIBRA-TABS) 100 MG tablet Take 100 mg by mouth 2 (two) times daily.    EScitalopram oxalate (LEXAPRO) 10 MG tablet Take 1 tablet (10 mg total) by mouth once daily.    famotidine (PEPCID) 20 MG tablet Take 20 mg by mouth 2 (two) times daily.    fluticasone propionate (FLONASE) 50 mcg/actuation nasal spray 1 spray (50 mcg total) by Each Nostril route 2 (two) times daily.    ibuprofen-famotidine (DUEXIS) 800-26.6 mg Tab Take 1 tablet by mouth nightly.    melatonin 5 mg Cap Take by mouth.    montelukast (SINGULAIR) 10 mg tablet Take 1 tablet (10 mg total) by mouth once daily.    valACYclovir (VALTREX) 1000 MG tablet Take 1 tablet (1,000 mg total) by mouth 3 (three) times daily as needed (as need for outbreak).    valsartan (DIOVAN) 80 MG tablet Take 1 tablet (80 mg total) by mouth once daily.     No current facility-administered medications for this visit.       Review of patient's allergies indicates:   Allergen Reactions    Penicillins Shortness Of Breath    Amoxicillin Diarrhea     Per patient     Codeine Hives       Family History   Problem Relation Age of Onset    Diabetes Mother     Cancer Father     Cancer Sister     Heart attack Sister     Heart attack Brother     Diabetes Brother        Social History     Socioeconomic History    Marital status:    Tobacco Use    Smoking status: Former     Types: Cigarettes     Quit date:      Years since quittin.0    Smokeless tobacco: Never   Substance and Sexual Activity    Alcohol use: Yes     Alcohol/week: 2.0 standard drinks     Types: 2 Cans of beer per week     Comment: Social    Drug use: Yes     Types: Marijuana     Comment: Per patient medicial marijuana    Sexual activity: Yes       Review of Systems:    Constitution:   Denies chills, fever, and sweats.  HENT:   Denies headaches or blurry vision.  Cardiovascular:  Denies chest pain or irregular heart beat.  Respiratory:   Denies cough or shortness of breath.  Gastrointestinal:  Denies  abdominal pain, nausea, or vomiting.  Musculoskeletal:   Denies muscle cramps.  Neurological:   Denies dizziness or focal weakness.  Psychiatric/Behavior: Normal mental status.  Hematology/Lymph:  Denies bleeding problem or easy bruising/bleeding.  Skin:    Denies rash or suspicious lesions.    Examination:    Vital Signs:    Vitals:    01/04/23 1453   BP: (!) 168/97   Pulse: 89   PainSc:   8       There is no height or weight on file to calculate BMI.    Constitution:   Well-developed, well nourished patient in no acute distress.  Neurological:   Alert and oriented x 3 and cooperative to examination.     Psychiatric/Behavior: Normal mental status.  Respiratory:   No shortness of breath.  Eyes:    Extraoccular muscles intact  Skin:    No scars, rash or suspicious lesions.    MSK:   Right upper extremity:  Surgical incisions over the A1 pulleys of index, middle, ring, small finger are healed without any sign of infection.  There is no tenderness to palpation over the A1 pulley although there is a Tinel sign in the webspace between the 3rd and 4th digit.  She also has a positive Tinel at the median nerve at the wrist as well as a positive Tinel at the ulnar nerve at the elbow.  Flexion and compression at the elbow reproduces numbness in the ring and small finger.  Phalen's and Durkan's tests are positive.  In the median nerve distribution she has 20% sensation.  In the ulnar nerve distribution she has 10% sensation with completely absent sensation on the radial aspect of the ring finger.  She is able to make a fist. She has a flexion contracture of the ring and middle fingers at the MP and IP joints secondary to dupuytren's cords. She is unable to place her hand flat on a table.  Radial    Imaging:   X-ray of the right elbow shows no fracture dislocations  X-ray of the right hand shows no fracture dislocations     Assessment:  Right carpal tunnel syndrome, right cubital tunnel syndrome, right radial digital nerve to  the ring finger injury, right middle and ring finger dupuytrens contracture    Plan:  She is tried conservative treatment for these.  She would like to have her carpal tunnel and cubital tunnel released.  She would like to also have her radial digital nerve to the ring finger explored.  I can book her for a right endoscopic cubital tunnel release, carpal tunnel release, radial digital nerve to the ring finger exploration.  I explained that if I find a neuroma of the radial digital nerve I will excise it reconstruct it with allograft.  She would also like her ring and middle finger dupuytren's cords excised. I will book her for Friday January 27    I explained that surgery and the nature of their condition are not without risks. These include, but are not limited to, bleeding, infection, neurovascular compromise, wound complications, scarring, cosmetic defects, need for later and/or repeated surgeries, pain, loss of ROM, loss of function, deformity, functional abnormalities, stiffness, thromboembolic complications, compartment syndrome, loss of limb, loss of life, anesthetic complications, and other imponderables. I explained that these can occur despite the adequacy of treatments rendered, and that their risks are heightened given the nature of their condition. They verbalized understanding. They would like to continue with surgery at this time. If appropriate family was involved with surgical discussion.     Follow Up:  After surgery  Xray at next visit:  None

## 2023-01-12 ENCOUNTER — TELEPHONE (OUTPATIENT)
Dept: INTERNAL MEDICINE | Facility: CLINIC | Age: 63
End: 2023-01-12
Payer: COMMERCIAL

## 2023-01-12 NOTE — TELEPHONE ENCOUNTER
Call the patient results of calcium heart scan   This was ordered by her cardiologist and he will be addressing this   Discussed she had a nuclear stress test 3 years ago which was normal   Her cardiologist is recommended her to begin a cholesterol medicine.

## 2023-01-19 RX ORDER — ESTRADIOL 0.1 MG/G
CREAM VAGINAL
COMMUNITY
Start: 2022-10-28 | End: 2023-02-23

## 2023-01-24 ENCOUNTER — ANESTHESIA EVENT (OUTPATIENT)
Dept: SURGERY | Facility: HOSPITAL | Age: 63
End: 2023-01-24
Payer: COMMERCIAL

## 2023-01-25 ENCOUNTER — TELEPHONE (OUTPATIENT)
Dept: ORTHOPEDICS | Facility: CLINIC | Age: 63
End: 2023-01-25
Payer: COMMERCIAL

## 2023-01-25 ENCOUNTER — TELEPHONE (OUTPATIENT)
Dept: INTERNAL MEDICINE | Facility: CLINIC | Age: 63
End: 2023-01-25
Payer: COMMERCIAL

## 2023-01-25 NOTE — TELEPHONE ENCOUNTER
Patient called back stating that she has spoke to PCP who states she can proceed with surgery. She was on the correct medications to treat it.   I called patient explained to patient that Dr. Richards also agreed it is ok to proceed with surgery.  Patient voiced a clear understanding.

## 2023-01-25 NOTE — TELEPHONE ENCOUNTER
----- Message from May Murillo sent at 1/25/2023  9:09 AM CST -----  Regarding: poison Ivy  Poison Nicolasa    x 3 weeks.    Would like a call from someone      Zandra   [phone]

## 2023-01-25 NOTE — TELEPHONE ENCOUNTER
Patient called stating that she believes she may have shingles.    I called patient back who states that it is on her left arm. Patient states that she did call her PCP. She is waiting on a call back. I instructed patient to call back once PCP returns her call with restrictions. Patient voiced a clear understanding.

## 2023-01-25 NOTE — TELEPHONE ENCOUNTER
Call back to patient  Has an outbreak of shingles on her wrist and hand   She would taken some Valtrex and cortisone cream gotten better recommend her take another course

## 2023-01-27 ENCOUNTER — ANESTHESIA (OUTPATIENT)
Dept: SURGERY | Facility: HOSPITAL | Age: 63
End: 2023-01-27
Payer: COMMERCIAL

## 2023-01-27 ENCOUNTER — HOSPITAL ENCOUNTER (OUTPATIENT)
Facility: HOSPITAL | Age: 63
Discharge: HOME | End: 2023-01-27
Attending: STUDENT IN AN ORGANIZED HEALTH CARE EDUCATION/TRAINING PROGRAM | Admitting: STUDENT IN AN ORGANIZED HEALTH CARE EDUCATION/TRAINING PROGRAM
Payer: COMMERCIAL

## 2023-01-27 VITALS
DIASTOLIC BLOOD PRESSURE: 64 MMHG | HEIGHT: 63 IN | TEMPERATURE: 98 F | SYSTOLIC BLOOD PRESSURE: 126 MMHG | HEART RATE: 78 BPM | RESPIRATION RATE: 16 BRPM | WEIGHT: 136.69 LBS | OXYGEN SATURATION: 95 % | BODY MASS INDEX: 24.22 KG/M2

## 2023-01-27 DIAGNOSIS — G56.01 CARPAL TUNNEL SYNDROME OF RIGHT WRIST: ICD-10-CM

## 2023-01-27 PROCEDURE — 64721 PR REVISE MEDIAN N/CARPAL TUNNEL SURG: ICD-10-PCS | Mod: 59,51,RT, | Performed by: STUDENT IN AN ORGANIZED HEALTH CARE EDUCATION/TRAINING PROGRAM

## 2023-01-27 PROCEDURE — 76942 ECHO GUIDE FOR BIOPSY: CPT | Performed by: ANESTHESIOLOGY

## 2023-01-27 PROCEDURE — 63600175 PHARM REV CODE 636 W HCPCS

## 2023-01-27 PROCEDURE — 36000707: Performed by: STUDENT IN AN ORGANIZED HEALTH CARE EDUCATION/TRAINING PROGRAM

## 2023-01-27 PROCEDURE — 29999 UNLISTED PX ARTHROSCOPY: CPT | Mod: ,,, | Performed by: STUDENT IN AN ORGANIZED HEALTH CARE EDUCATION/TRAINING PROGRAM

## 2023-01-27 PROCEDURE — 63600175 PHARM REV CODE 636 W HCPCS: Performed by: NURSE ANESTHETIST, CERTIFIED REGISTERED

## 2023-01-27 PROCEDURE — 29999 PR ARTHROSCOPIC DECOMPRESSION OF NERVE: ICD-10-PCS | Mod: ,,, | Performed by: STUDENT IN AN ORGANIZED HEALTH CARE EDUCATION/TRAINING PROGRAM

## 2023-01-27 PROCEDURE — 37000008 HC ANESTHESIA 1ST 15 MINUTES: Performed by: STUDENT IN AN ORGANIZED HEALTH CARE EDUCATION/TRAINING PROGRAM

## 2023-01-27 PROCEDURE — 64912 NRV RPR W/NRV ALGRFT 1ST: CPT | Mod: RT,,, | Performed by: STUDENT IN AN ORGANIZED HEALTH CARE EDUCATION/TRAINING PROGRAM

## 2023-01-27 PROCEDURE — 27201423 OPTIME MED/SURG SUP & DEVICES STERILE SUPPLY: Performed by: STUDENT IN AN ORGANIZED HEALTH CARE EDUCATION/TRAINING PROGRAM

## 2023-01-27 PROCEDURE — 63600175 PHARM REV CODE 636 W HCPCS: Mod: TB,JG | Performed by: STUDENT IN AN ORGANIZED HEALTH CARE EDUCATION/TRAINING PROGRAM

## 2023-01-27 PROCEDURE — 26123 RELEASE PALM CONTRACTURE: CPT | Mod: 51,F7,, | Performed by: STUDENT IN AN ORGANIZED HEALTH CARE EDUCATION/TRAINING PROGRAM

## 2023-01-27 PROCEDURE — 26125 RELEASE PALM CONTRACTURE: CPT | Mod: F8,,, | Performed by: STUDENT IN AN ORGANIZED HEALTH CARE EDUCATION/TRAINING PROGRAM

## 2023-01-27 PROCEDURE — 64912 PR NERVE REPAIR, W/NERVE ALLOGRAFT, 1ST STRAND: ICD-10-PCS | Mod: RT,,, | Performed by: STUDENT IN AN ORGANIZED HEALTH CARE EDUCATION/TRAINING PROGRAM

## 2023-01-27 PROCEDURE — 26125 PR PART PALMAR FASCIEC,OPEN ADDNL DIGIT: ICD-10-PCS | Mod: F8,,, | Performed by: STUDENT IN AN ORGANIZED HEALTH CARE EDUCATION/TRAINING PROGRAM

## 2023-01-27 PROCEDURE — 64721 CARPAL TUNNEL SURGERY: CPT | Mod: 59,51,RT, | Performed by: STUDENT IN AN ORGANIZED HEALTH CARE EDUCATION/TRAINING PROGRAM

## 2023-01-27 PROCEDURE — 25000003 PHARM REV CODE 250: Performed by: NURSE ANESTHETIST, CERTIFIED REGISTERED

## 2023-01-27 PROCEDURE — 36000706: Performed by: STUDENT IN AN ORGANIZED HEALTH CARE EDUCATION/TRAINING PROGRAM

## 2023-01-27 PROCEDURE — 26123 PR PART PALMAR FASCIEC,OPEN 1 DIGIT: ICD-10-PCS | Mod: 51,F7,, | Performed by: STUDENT IN AN ORGANIZED HEALTH CARE EDUCATION/TRAINING PROGRAM

## 2023-01-27 PROCEDURE — 63600175 PHARM REV CODE 636 W HCPCS: Performed by: ANESTHESIOLOGY

## 2023-01-27 PROCEDURE — 27800903 OPTIME MED/SURG SUP & DEVICES OTHER IMPLANTS: Performed by: STUDENT IN AN ORGANIZED HEALTH CARE EDUCATION/TRAINING PROGRAM

## 2023-01-27 PROCEDURE — 25000003 PHARM REV CODE 250: Performed by: ANESTHESIOLOGY

## 2023-01-27 PROCEDURE — 71000015 HC POSTOP RECOV 1ST HR: Performed by: STUDENT IN AN ORGANIZED HEALTH CARE EDUCATION/TRAINING PROGRAM

## 2023-01-27 PROCEDURE — 37000009 HC ANESTHESIA EA ADD 15 MINS: Performed by: STUDENT IN AN ORGANIZED HEALTH CARE EDUCATION/TRAINING PROGRAM

## 2023-01-27 DEVICE — GRAFT AVANCE NERVE 1-2MMX70MM: Type: IMPLANTABLE DEVICE | Site: HAND | Status: FUNCTIONAL

## 2023-01-27 RX ORDER — MIDAZOLAM HYDROCHLORIDE 1 MG/ML
2 INJECTION INTRAMUSCULAR; INTRAVENOUS ONCE AS NEEDED
Status: COMPLETED | OUTPATIENT
Start: 2023-01-27 | End: 2023-01-27

## 2023-01-27 RX ORDER — ONDANSETRON 4 MG/1
4 TABLET, ORALLY DISINTEGRATING ORAL EVERY 6 HOURS PRN
Status: DISCONTINUED | OUTPATIENT
Start: 2023-01-27 | End: 2023-01-27 | Stop reason: HOSPADM

## 2023-01-27 RX ORDER — IPRATROPIUM BROMIDE AND ALBUTEROL SULFATE 2.5; .5 MG/3ML; MG/3ML
3 SOLUTION RESPIRATORY (INHALATION) ONCE AS NEEDED
Status: CANCELLED | OUTPATIENT
Start: 2023-01-27 | End: 2034-06-25

## 2023-01-27 RX ORDER — ACETAMINOPHEN 10 MG/ML
1000 INJECTION, SOLUTION INTRAVENOUS ONCE
Status: CANCELLED | OUTPATIENT
Start: 2023-01-27 | End: 2023-01-27

## 2023-01-27 RX ORDER — MORPHINE SULFATE 4 MG/ML
4 INJECTION, SOLUTION INTRAMUSCULAR; INTRAVENOUS
Status: CANCELLED | OUTPATIENT
Start: 2023-01-27

## 2023-01-27 RX ORDER — METOCLOPRAMIDE HYDROCHLORIDE 5 MG/ML
10 INJECTION INTRAMUSCULAR; INTRAVENOUS EVERY 6 HOURS PRN
Status: CANCELLED | OUTPATIENT
Start: 2023-01-27

## 2023-01-27 RX ORDER — SODIUM CHLORIDE 9 MG/ML
INJECTION, SOLUTION INTRAVENOUS CONTINUOUS
Status: CANCELLED | OUTPATIENT
Start: 2023-01-27

## 2023-01-27 RX ORDER — SODIUM CHLORIDE 9 MG/ML
INJECTION, SOLUTION INTRAVENOUS CONTINUOUS
Status: DISCONTINUED | OUTPATIENT
Start: 2023-01-27 | End: 2023-01-27 | Stop reason: HOSPADM

## 2023-01-27 RX ORDER — CEFAZOLIN SODIUM 2 G/100ML
2 INJECTION, SOLUTION INTRAVENOUS
Status: DISCONTINUED | OUTPATIENT
Start: 2023-01-27 | End: 2023-01-27

## 2023-01-27 RX ORDER — CEFAZOLIN SODIUM 2 G/100ML
2 INJECTION, SOLUTION INTRAVENOUS
Status: DISCONTINUED | OUTPATIENT
Start: 2023-01-27 | End: 2023-01-27 | Stop reason: HOSPADM

## 2023-01-27 RX ORDER — ROPIVACAINE HYDROCHLORIDE 5 MG/ML
INJECTION, SOLUTION EPIDURAL; INFILTRATION; PERINEURAL
Status: COMPLETED
Start: 2023-01-27 | End: 2023-01-27

## 2023-01-27 RX ORDER — METHOCARBAMOL 500 MG/1
500 TABLET, FILM COATED ORAL EVERY 6 HOURS PRN
Status: CANCELLED | OUTPATIENT
Start: 2023-01-27

## 2023-01-27 RX ORDER — OXYCODONE AND ACETAMINOPHEN 5; 325 MG/1; MG/1
1 TABLET ORAL EVERY 4 HOURS PRN
Qty: 28 TABLET | Refills: 0 | Status: SHIPPED | OUTPATIENT
Start: 2023-01-27 | End: 2023-02-09 | Stop reason: SDUPTHER

## 2023-01-27 RX ORDER — SODIUM CITRATE AND CITRIC ACID MONOHYDRATE 334; 500 MG/5ML; MG/5ML
30 SOLUTION ORAL ONCE
Status: COMPLETED | OUTPATIENT
Start: 2023-01-27 | End: 2023-01-27

## 2023-01-27 RX ORDER — MIDAZOLAM HYDROCHLORIDE 1 MG/ML
INJECTION INTRAMUSCULAR; INTRAVENOUS
Status: COMPLETED
Start: 2023-01-27 | End: 2023-01-27

## 2023-01-27 RX ORDER — PROPOFOL 10 MG/ML
VIAL (ML) INTRAVENOUS CONTINUOUS PRN
Status: DISCONTINUED | OUTPATIENT
Start: 2023-01-27 | End: 2023-01-27

## 2023-01-27 RX ORDER — HYDROMORPHONE HYDROCHLORIDE 2 MG/ML
0.2 INJECTION, SOLUTION INTRAMUSCULAR; INTRAVENOUS; SUBCUTANEOUS EVERY 5 MIN PRN
Status: CANCELLED | OUTPATIENT
Start: 2023-01-27

## 2023-01-27 RX ORDER — ONDANSETRON 2 MG/ML
4 INJECTION INTRAMUSCULAR; INTRAVENOUS EVERY 6 HOURS PRN
Status: CANCELLED | OUTPATIENT
Start: 2023-01-27

## 2023-01-27 RX ORDER — OXYCODONE AND ACETAMINOPHEN 5; 325 MG/1; MG/1
1 TABLET ORAL EVERY 4 HOURS PRN
Qty: 28 TABLET | Refills: 0 | Status: SHIPPED | OUTPATIENT
Start: 2023-01-27 | End: 2023-02-01 | Stop reason: SDUPTHER

## 2023-01-27 RX ORDER — SODIUM CHLORIDE, SODIUM LACTATE, POTASSIUM CHLORIDE, CALCIUM CHLORIDE 600; 310; 30; 20 MG/100ML; MG/100ML; MG/100ML; MG/100ML
1000 INJECTION, SOLUTION INTRAVENOUS CONTINUOUS
Status: DISCONTINUED | OUTPATIENT
Start: 2023-01-27 | End: 2023-01-27 | Stop reason: HOSPADM

## 2023-01-27 RX ORDER — LIDOCAINE HYDROCHLORIDE 10 MG/ML
1 INJECTION, SOLUTION EPIDURAL; INFILTRATION; INTRACAUDAL; PERINEURAL ONCE
Status: DISCONTINUED | OUTPATIENT
Start: 2023-01-27 | End: 2023-01-27 | Stop reason: HOSPADM

## 2023-01-27 RX ORDER — ROPIVACAINE HYDROCHLORIDE 5 MG/ML
INJECTION, SOLUTION EPIDURAL; INFILTRATION; PERINEURAL
Status: COMPLETED | OUTPATIENT
Start: 2023-01-27 | End: 2023-01-27

## 2023-01-27 RX ORDER — HYDROCODONE BITARTRATE AND ACETAMINOPHEN 5; 325 MG/1; MG/1
1 TABLET ORAL EVERY 4 HOURS PRN
Status: CANCELLED | OUTPATIENT
Start: 2023-01-27

## 2023-01-27 RX ORDER — MIDAZOLAM HYDROCHLORIDE 1 MG/ML
INJECTION INTRAMUSCULAR; INTRAVENOUS
Status: DISCONTINUED | OUTPATIENT
Start: 2023-01-27 | End: 2023-01-27

## 2023-01-27 RX ORDER — GLYCOPYRROLATE 0.2 MG/ML
INJECTION INTRAMUSCULAR; INTRAVENOUS
Status: DISCONTINUED | OUTPATIENT
Start: 2023-01-27 | End: 2023-01-27

## 2023-01-27 RX ORDER — LIDOCAINE HYDROCHLORIDE 10 MG/ML
INJECTION, SOLUTION EPIDURAL; INFILTRATION; INTRACAUDAL; PERINEURAL
Status: DISCONTINUED | OUTPATIENT
Start: 2023-01-27 | End: 2023-01-27

## 2023-01-27 RX ORDER — ONDANSETRON 2 MG/ML
INJECTION INTRAMUSCULAR; INTRAVENOUS
Status: DISCONTINUED | OUTPATIENT
Start: 2023-01-27 | End: 2023-01-27

## 2023-01-27 RX ORDER — MEPERIDINE HYDROCHLORIDE 25 MG/ML
12.5 INJECTION INTRAMUSCULAR; INTRAVENOUS; SUBCUTANEOUS ONCE AS NEEDED
Status: CANCELLED | OUTPATIENT
Start: 2023-01-27 | End: 2023-01-28

## 2023-01-27 RX ADMIN — MIDAZOLAM HYDROCHLORIDE 2 MG: 1 INJECTION INTRAMUSCULAR; INTRAVENOUS at 10:01

## 2023-01-27 RX ADMIN — MIDAZOLAM 2 MG: 1 INJECTION INTRAMUSCULAR; INTRAVENOUS at 11:01

## 2023-01-27 RX ADMIN — GLYCOPYRROLATE 0.2 MG: 0.2 INJECTION INTRAMUSCULAR; INTRAVENOUS at 11:01

## 2023-01-27 RX ADMIN — CEFAZOLIN SODIUM 2 G: 2 INJECTION, SOLUTION INTRAVENOUS at 11:01

## 2023-01-27 RX ADMIN — ONDANSETRON HYDROCHLORIDE 4 MG: 2 SOLUTION INTRAMUSCULAR; INTRAVENOUS at 11:01

## 2023-01-27 RX ADMIN — MIDAZOLAM 2 MG: 1 INJECTION INTRAMUSCULAR; INTRAVENOUS at 10:01

## 2023-01-27 RX ADMIN — PROPOFOL 150 MCG/KG/MIN: 10 INJECTION, EMULSION INTRAVENOUS at 11:01

## 2023-01-27 RX ADMIN — SODIUM CITRATE AND CITRIC ACID MONOHYDRATE 30 ML: 500; 334 SOLUTION ORAL at 08:01

## 2023-01-27 RX ADMIN — LIDOCAINE HYDROCHLORIDE 50 MG: 10 INJECTION, SOLUTION EPIDURAL; INFILTRATION; INTRACAUDAL; PERINEURAL at 11:01

## 2023-01-27 RX ADMIN — SODIUM CHLORIDE, SODIUM GLUCONATE, SODIUM ACETATE, POTASSIUM CHLORIDE AND MAGNESIUM CHLORIDE: 526; 502; 368; 37; 30 INJECTION, SOLUTION INTRAVENOUS at 11:01

## 2023-01-27 RX ADMIN — ROPIVACAINE HYDROCHLORIDE 30 ML: 5 INJECTION, SOLUTION EPIDURAL; INFILTRATION; PERINEURAL at 10:01

## 2023-01-27 NOTE — ANESTHESIA PREPROCEDURE EVALUATION
"                                                                                                             01/27/2023  Zandra Levy is a 62 y.o., female.      Pre-op Assessment    I have reviewed the Patient Summary Reports.     I have reviewed the Nursing Notes. I have reviewed the NPO Status.   I have reviewed the Medications.     Review of Systems      Physical Exam  General: Well nourished and Cooperative    Airway:  Mallampati: II   Mouth Opening: Normal  TM Distance: Normal  Tongue: Normal  Neck ROM: Normal ROM    Dental:  Caps / Implants    Chest/Lungs:  Clear to auscultation    Heart:  Rate: Normal        Anesthesia Plan  Type of Anesthesia, risks & benefits discussed:    Anesthesia Type: Regional  Intra-op Monitoring Plan: Standard ASA Monitors  Post Op Pain Control Plan: multimodal analgesia  Informed Consent: Informed consent signed with the Patient and all parties understand the risks and agree with anesthesia plan.  All questions answered.   ASA Score: 2  Day of Surgery Review of History & Physical: H&P Update referred to the surgeon/provider.    Ready For Surgery From Anesthesia Perspective.     .  I explained anesthesia plan to patient/responsbile party if available.  Anesthesia consent done going over the material facts, risks, complications & alternatives, obtained which includes the possibility of altering the anesthesia plan.  I reviewed problem list, appropriate labs, any workup, Xray, EKG etc noted below.  Patients condition is satisfactory to proceed with anesthesia plan unless otherwise noted (see anesthesia chart for details of the anesthesia plan carried out).      Pre-operative evaluation for Procedure(s) (LRB):  RELEASE, CARPAL TUNNEL (Right)  TRANSPOSITION, NERVE, ULNAR - endoscopic (Right)  RELEASE, DUPUYTREN CONTRACTURE (Right)  REPAIR, NERVE USING ALLOGRAFT - Microscope, axogen 1-2 x 15 graft, vistaseal, micro instruments (Right)    Ht 5' 2.99" (1.6 m)   Wt 59.9 kg (132 lb)   " Breastfeeding No   BMI 23.39 kg/m²     Patient Active Problem List   Diagnosis    Primary hypertension    Anxiety disorder, unspecified    Vitamin D deficiency    Osteopenia    Herpes simplex       Review of patient's allergies indicates:   Allergen Reactions    Penicillins Shortness Of Breath    Amoxicillin Diarrhea     Per patient     Codeine Hives       Current Outpatient Medications   Medication Instructions    ALPRAZolam (XANAX) 0.25 mg, Oral, Daily PRN    cholecalciferol (vitamin D3) (VITAMIN D3) 1,000 Units, Oral, Daily    doxycycline (VIBRA-TABS) 100 mg, Oral, 2 times daily    EScitalopram oxalate (LEXAPRO) 10 mg, Oral, Daily    estradioL (ESTRACE) 0.01 % (0.1 mg/gram) vaginal cream Vaginal    famotidine (PEPCID) 20 mg, Oral, 2 times daily PRN    fluticasone propionate (FLONASE) 50 mcg, Each Nostril, 2 times daily    ibuprofen-famotidine (DUEXIS) 800-26.6 mg Tab 1 tablet, Oral, Nightly    melatonin 5 mg Cap 1 capsule, Oral, Nightly    montelukast (SINGULAIR) 10 mg, Oral, Daily    NON FORMULARY MEDICATION 1 tablet, Oral, 2 times daily    NON FORMULARY MEDICATION No dose, route, or frequency recorded.    NON FORMULARY MEDICATION Oral, As needed (PRN)    valACYclovir (VALTREX) 1,000 mg, Oral, 3 times daily PRN    valsartan (DIOVAN) 80 mg, Oral, Daily       Past Surgical History:   Procedure Laterality Date    ANTEGRADE INTRAMEDULLARY RODDING OF FEMUR Left     BREAST SURGERY      x 3 2022    BREAST SURGERY      22    CLOSED REDUCTION OF INJURY OF SHOULDER Left     HAND SURGERY      3 years ago    MOUTH SURGERY      fx palate    removal minda of femur Left     ROTATOR CUFF REPAIR Left     bicep tendon repair       Social History     Socioeconomic History    Marital status:    Tobacco Use    Smoking status: Former     Types: Cigarettes     Quit date:      Years since quittin.0    Smokeless tobacco: Never   Substance and Sexual Activity    Alcohol use:  Yes     Alcohol/week: 2.0 standard drinks     Types: 2 Cans of beer per week     Comment: Social    Drug use: Yes     Types: Marijuana     Comment: Per patient medicial marijuana    Sexual activity: Yes       Lab Results   Component Value Date    WBC 6.0 01/04/2023    HGB 12.6 01/04/2023    HCT 38.1 01/04/2023    MCV 94.5 (H) 01/04/2023     01/04/2023          BMP  Lab Results   Component Value Date    HCT 38.1 01/04/2023     01/04/2023    K 4.5 01/04/2023    BUN 18.6 01/04/2023    CREATININE 0.67 01/04/2023    CALCIUM 9.7 01/04/2023        INR  No results for input(s): PT, INR, PROTIME, APTT in the last 72 hours.        Diagnostic Studies:      EKG:  Results for orders placed or performed in visit on 01/04/23   EKG 12-lead    Collection Time: 01/04/23  5:17 PM    Narrative    Test Reason : G56.01,    Vent. Rate : 061 BPM     Atrial Rate : 061 BPM     P-R Int : 142 ms          QRS Dur : 092 ms      QT Int : 398 ms       P-R-T Axes : 060 059 061 degrees     QTc Int : 400 ms    Sinus rhythm with marked sinus arrythmia  Otherwise normal ECG    No previous ECGs available  Confirmed by Blue Alegria MD ([id number]) on 1/6/2023 8:19:57 AM    Referred By: BOB ALLAN           Confirmed By:Blue Alegria MD

## 2023-01-27 NOTE — DISCHARGE SUMMARY
[facility] - Periop Services  Discharge Note  Short Stay    Procedure(s) (LRB):  RELEASE, CARPAL TUNNEL (Right)  TRANSPOSITION, NERVE, ULNAR - endoscopic (Right)  RELEASE, DUPUYTREN CONTRACTURE (Right)  REPAIR, NERVE USING ALLOGRAFT - Microscope, axogen 1-2 x 15 graft, vistaseal, micro instruments (Right)      OUTCOME: Patient tolerated treatment/procedure well without complication and is now ready for discharge.    DISPOSITION: Home or Self Care    FINAL DIAGNOSIS:  <principal problem not specified>    FOLLOWUP: In clinic    DISCHARGE INSTRUCTIONS:    Discharge Procedure Orders   SLING ORTHOPEDIC MEDIUM FOR HOME USE     Diet general     Activity as tolerated     Keep surgical extremity elevated     Ice to affected area     Lifting restrictions   Order Comments: No lifting, pushing, pulling with operative extremity     No driving, operating heavy equipment or signing legal documents while taking pain medication.     Other restrictions (specify):   Order Comments: Okay to wean sling as tolerated.     Leave dressing on - Keep it clean, dry, and intact until clinic visit     Call MD for:  temperature >100.4     Call MD for:  persistent nausea and vomiting     Call MD for:  severe uncontrolled pain     Call MD for:  difficulty breathing, headache or visual disturbances     Call MD for:  redness, tenderness, or signs of infection (pain, swelling, redness, odor or green/yellow discharge around incision site)     Call MD for:  hives     Call MD for:  persistent dizziness or light-headedness     Call MD for:  extreme fatigue     Shower on day dressing removed (No bath)        TIME SPENT ON DISCHARGE: 15 minutes

## 2023-01-27 NOTE — ANESTHESIA PROCEDURE NOTES
Peripheral Block    Patient location during procedure: pre-op   Block not for primary anesthetic.  Reason for block: at surgeon's request and post-op pain management   Post-op Pain Location: R   Start time: 1/27/2023 10:06 AM  Timeout: 1/27/2023 10:06 AM   End time: 1/27/2023 10:16 AM    Staffing  Authorizing Provider: Roger New MD  Performing Provider: Roger New MD    Preanesthetic Checklist  Completed: patient identified, IV checked, site marked, risks and benefits discussed, surgical consent, monitors and equipment checked, pre-op evaluation and timeout performed  Peripheral Block  Patient position: supine  Prep: ChloraPrep and Mask worn, hand hygeine performed, sterile gloves worn  Patient monitoring: continuous pulse ox, frequent blood pressure checks and cardiac monitor (RN monitoring vitals throughout procedure.)  Block type: supraclavicular  Laterality: right  Injection technique: single shot  Needle  Needle type: Stimuplex   Needle gauge: 22 G  Needle length: 2 in  Needle localization: anatomical landmarks and ultrasound guidance  Needle insertion depth: 4 cm   -ultrasound image captured on disc.  Assessment  Injection assessment: negative aspiration, negative parasthesia and local visualized surrounding nerve  Paresthesia pain: none  Heart rate change: no  Slow fractionated injection: yes  Pain Tolerance: comfortable throughout block  Medications:    Medications: ropivacaine (NAROPIN) injection 0.5% - Perineural   30 mL - 1/27/2023 10:14:00 AM    Additional Notes  Block..See EMR for any note re current/previous paresthesia, dysesthesias or chronic pain complaints in limb to be blocked.  US used to observe needle trajectory, needle placed in close proximity to appropriate nerve structures, they appeared anatomically normal.  Deposition of LA in close proximity to nerve structures observed.  Perm image saved.  Stimulation of appropriate muscles noted @ 0.5ma with a + Jonathan test.  No  resistance to injection was encountered.  No paraesthesia or aspiration of blood observed.  No pain with injection.  See anesthesia chart for any drugs used to supplement the block. Sedation was provided at a level for patient to be able to communicate any discomfort.

## 2023-01-27 NOTE — DISCHARGE INSTRUCTIONS
[facility] [place]CHAR[place]. [address]  [phone]    DIET    YOUR FIRST MEAL SHOULD BE LIQUID: I.E. SOUPS, JELLO, JUICE. IF YOUR LIQUID MEAL IS TOLERATED WELL THEN YOU MAY PROGRESS TO A SMALL LIGHT MEAL.   IF NAUSEA AND VOMITING OCCUR RETURN TO THE LIQUID DIET AND PROGRESS TO A NORMAL SOLID DIET SLOWLY.  IF THE NAUSEA AND VOMITING DOES NOT STOP, NOTIFY YOUR HEALTH CARE PROVIDER.      GENERAL ANESTHESIA OR SEDATION    DO NOT DRIVE OR PARTICIPATE IN ANY ACTIVITIES THAT REQUIRE COORDINATION FOR THE NEXT 24 HOURS: I.E. SWIMMING, BIKING, OPERATING HEAVY MACHINERY, COOKING, USING POWER TOOLS, CLIMBING LADDERS.   FOR THE NEXT 24 HOURS DO NOT: DRIVE, DRINK ALCOHOL, MAKE ANY IMPORTANT DECISIONS OR SIGN ANY LEGAL DOCUMENTS.   STAY WITH AN ADULT DURING THE 24 HOURS AFTER YOUR SURGERY.   DRINK ENOUGH FLUIDS TO KEEP YOUR URINE CLEAR TO PALE YELLOW.      PREVENTING CONSTIPATION AFTER SURGERY    EAT FOOD HIGH IN FIBER AND DRINK PLENTY OF FLUIDS, ESPECIALLY WATER.   YOU MAY TAKE AN OVER THE COUNTER FIBER SUPPLEMENT OR STOOL SOFTENER AS DIRECTED ON THE PACKAGE.   STAYING MOBILE, IF POSSIBLE, HELPS TO PREVENT CONSTIPATION.  CONTACT YOUR DOCTOR IF YOU HAVE NOT HAD A BOWEL MOVEMENT IN 3 DAYS AFTER SURGERY.       INFECTION CONTROL    KEEP YOUR DRESSING CLEAN, DRY AND INTACT.   FOLLOW PHYSICIAN INSTRUCTIONS REGARDING REMOVAL OF DRESSING.  DO NOT TOUCH SURGICAL SITE OR APPLY LOTIONS, POWDERS, CREAMS OR OINTMENTS ON YOUR INCISION UNLESS INSTRUCTED TO DO SO BY YOUR HEALTH CARE PROVIDER.  ONCE THE DRESSING HAS BEEN REMOVED, CHECK THE INCISION SITE DAILY FOR: INCREASED REDNESS, SWELLING, FLUID OR BLOOD, WARMTH, PUS, FOUL SMELL OR INCREASED PAIN.      DEEP VEIN THROMBOSIS (DVT)    A DVT IS A BLOOD CLOT THAT CAN DEVELOP IN THE DEEP AND LARGER VEINS OF THE LEG, ARM OR PELVIS.   RISK FACTORS INCLUDE SITTING OR LYING FOR LONG PERIODS OF TIME. THIS INCLUDES RECOVERING FROM SURGERY.  PREVENTION INCLUDES:  AVOID SITTING STILL FOR LONG PERIODS WITHOUT MOVING YOUR LEGS, DO NOT SMOKE AND IF POSSIBLE AVOID MEDICATIONS THAT CONTAIN ESTROGEN.   SIGNS AND SYMPTOMS THAT SHOULD BE REPORTED IMMEDIATELY INCLUDE: SWELLING IN AN ARM OR LEG, WARMTH, REDNESS OR PAIN IN ONE AREA OF THE LEG OR ARM THAT DOES NOT COME FROM THE INCISION. IF THE CLOT IS IN YOUR LEG, THE SYMPTOMS WILL BE WORSE WHEN STANDING OR WALKING.   SIGNS OF A PULMONARY EMBOLISM OR PE (A CLOT THAT MOVED TO YOUR LUNG): SHORTNESS OF BREATH, COUGHING , ESPECIALLY IF ACCOMPANIED WITH BLOODY MUCUS, CHEST PAIN OR RAPID HEART RATE.  IF YOU EXPERIENCE THESE SYMPTOMS, YOU SHOULD GET EMERGENCY TREATMENT RIGHT AWAY. DO NOT WAIT TO SEE IF THESE SYMPTOMS WILL GO AWAY. DO NOT DRIVE YOURSELF TO THE HOSPITAL.       CONTACT YOUR HEALTH CARE PROVIDER IF:    YOU HAVE REDNESS, SWELLING OR PAIN AROUND YOUR INCISION.  YOUR INCISION FEELS WARM TO THE TOUCH OR IS LEAKING EXCESSIVE FLUID/ BLOOD.  YOUR SUTURES OR STAPLES HAVE COME UNDONE.  YOU HAVE A TEMPERATURE .5 OR GREATER.  YOU ARE NOT ABLE TO URINATE WITHIN 6 HOURS AFTER SURGERY.  YOU HAVE UNRESOLVED NAUSEA AND VOMITING.       SEEK IMMEDIATE MEDICAL CARE IF:    YOU HAVE PERSISTENT NAUSEA AND VOMITING.   YOU ARE UNABLE TO EAT OR DRINK.   YOU HAVE DIFFICULTY SPEAKING AND/ OR BREATHING.  YOUR SKIN COLOR APPEARS BLUE OR GRAY.  YOU HAVE A RED STREAK COMING FROM YOUR INCISION.  YOUR INCISION BLEEDS THROUGH THE DRESSING AND DOES NOT STOP WITH GENTLY PRESSURE.  YOU HAVE SEVERE PAIN THAT DOES NOT DECREASE WITH YOUR MEDICATIONS.

## 2023-01-27 NOTE — OR NURSING
10:04  timeout done    10:11  DR. GUZMAN WILL ATTEMPT TO DO A RIGHT SUPRACLAVICULAR NERVE BLOCK.    10:15  BLOCK COMPLETED AND TOLERATED WELL.

## 2023-01-27 NOTE — ANESTHESIA POSTPROCEDURE EVALUATION
Anesthesia Post Evaluation    Patient: Zandra Levy    Procedure(s) Performed: Procedure(s) (LRB):  RELEASE, CARPAL TUNNEL (Right)  TRANSPOSITION, NERVE, ULNAR - endoscopic (Right)  RELEASE, DUPUYTREN CONTRACTURE (Right)  REPAIR, NERVE USING ALLOGRAFT - Microscope, axogen 1-2 x 15 graft, vistaseal, micro instruments (Right)    Final Anesthesia Type: general (/Regional//MAC)      Patient location during evaluation: PACU  Post-procedure mental status: @ basline.  Post-procedure vital signs: reviewed and stable  Pain management: adequate    PONV status: See postop meds for drugs used to control n/v if any.  Anesthetic complications: no      Cardiovascular status: blood pressure returned to baseline  Respiratory status: @ baseline.  Hydration status: euvolemic                                                No case tracking events are documented in the log.      Pain/Santi Score: Santi Score: 10 (1/27/2023  2:38 PM)  Modified Santi Score: 19 (1/27/2023  2:38 PM)

## 2023-01-27 NOTE — TRANSFER OF CARE
"Anesthesia Transfer of Care Note    Patient: Zandra Levy    Procedure(s) Performed: Procedure(s) (LRB):  RELEASE, CARPAL TUNNEL (Right)  TRANSPOSITION, NERVE, ULNAR - endoscopic (Right)  RELEASE, DUPUYTREN CONTRACTURE (Right)  REPAIR, NERVE USING ALLOGRAFT - Microscope, axogen 1-2 x 15 graft, vistaseal, micro instruments (Right)    Patient location: PACU    Anesthesia Type: general    Transport from OR: Transported from OR on room air with adequate spontaneous ventilation    Post pain: adequate analgesia    Post assessment: no apparent anesthetic complications and tolerated procedure well    Post vital signs: stable    Level of consciousness: responds to stimulation    Nausea/Vomiting: no nausea/vomiting    Complications: none    Transfer of care protocol was followed      Last vitals:   Visit Vitals  /64   Pulse 78   Temp 36.4 °C (97.5 °F)   Resp 16   Ht 5' 3" (1.6 m)   Wt 62 kg (136 lb 11 oz)   SpO2 95%   Breastfeeding No   BMI 24.21 kg/m²     "

## 2023-01-28 NOTE — OP NOTE
Operative Note    Patient Information:  Zandra Levy    Date of Surgery:  01/27/2023    Surgeon:  Mike Richards MD    Assistant:  None    Pre-operative Diagnosis:  Right carpal tunnel syndrome  Right cubital tunnel syndrome  Right ring finger radial digital nerve injury  Right middle and ring finger Dupuytrens contractures    Post-operative Diagnosis:  Right carpal tunnel syndrome  Right cubital tunnel syndrome  Right ring finger radial digital nerve laceration with neuroma  Right middle and ring finger Dupuytrens contractures    Procedure Performed:  Right endoscopic in situ cubital tunnel release (CPT 01139)  Right carpal tunnel release (CPT 84336)   Right limited palmar fasciectomy involving MP and PIP joints right middle finger (CPT 82135)   Right limited palmar fasciectomy involving MP and PIP joints right ring finger (CPT 77602)   Neuroma excision right ring finger radial digital nerve (CPT 71355)  Internal neurolysis using operating microscope right median nerve (CPT 04990)   Right radial digital nerve to ring finger reconstruction with nerve allograft (CPT 55223)    Anesthesia:  See anesthesia record    Complications:  None    Blood Loss:  See anesthesia record    Specimens:  None    Implants:  Implant Name Type Inv. Item Serial No.  Lot No. LRB No. Used Action   GRAFT AVANCE NERVE [id number] - [id number]  GRAFT AVANCE NERVE [id number]  AXOGEN [id number] Right 1 Implanted   vistaseal    [employer]  Right 1 Implanted        Indications for Procedure:  Zandra Levy is a 62 y.o. female that had a previous trigger finger release and sustained an injury to the radial digital nerve of the ring finger.  She also has carpal tunnel and cubital tunnel syndrome as well as Dupuytren's contracture affecting the MP and PIP joints of the right middle and ring fingers.  She was seen in clinic and booked for surgery.    Risks and benefits of the procedure were discussed with the patient. I explained that  surgery and the nature of their condition are not without risks. These include, but are not limited to, bleeding, infection, neurovascular compromise, wound complications, scarring, cosmetic defects, need for later and/or repeated surgeries, pain, loss of ROM, loss of function, deformity, functional abnormalities, stiffness, thromboembolic complications, compartment syndrome, loss of limb, loss of life, anesthetic complications, and other imponderables. I explained that these can occur despite the adequacy of treatments rendered, and that their risks are heightened given the nature of their condition. They verbalized understanding. They would like to continue with surgery at this time. If appropriate family was involved with surgical discussion.  . The patient expressed understanding of and agreement with the plan. Informed consent was obtained and signed prior going to the operative room.    Procedure in Detail:  Patient was brought to the operating room by the anesthesia team. Anesthesia was administered per the anesthesia record. The patient was left on the stretcher and a hand table was placed on the side of the bed.     Time out was performed and all parties present agreed with correct patient, correct procedure, correct side, correct site. The operative extremity was prepped and draped in standard normal fashion.     Pre-incision antibiotics were administered prior to skin incision. The tourniquet was inflated to 250mm HG.     A curvilinear incision was made over the medial elbow between the medial epicondyle and olecranon.  Dissection was carefully taken through subcutaneous tissue with care to avoid any injury to crossing cutaneous branches specifically the medial antebrachial cutaneous nerve.  Dissection was carried down to the ulnar nerve at the medial epicondyle.  The ligament of Rojas was released and the nerve was freed at the level of the medial epicondyle.  After complete release of the nerve  through the incision, a nasal speculum was placed into the wound distally.  Under endoscopic visualization, the FCU fascia was carefully released.  Speculum was removed and reinserted in a deeper layer to expose the ulnar nerve and its sheath.  The sheath above the ulnar nerve was carefully released to the level of the 1st motor branch under endoscopic visualization.    The speculum was removed and inserted proximally.  Under endoscopic visualization the arcade of [place] was released.  Sheath above the ulnar nerve was also released to the mid brachium.    The elbow was ranged and the nerve was found to not sublux.    The wound was copiously irrigated.  A Hemovac drain was placed.  The skin was closed with 3-0 Monocryl and 3-0 nylon suture.    An approximately 2.0 centimeter longitudinal incision was made beginning 0.5 centimeters distal to the volar wrist crease extending distalward in line with the radial aspect of the ring finger ray.  Careful dissection was performed in order to preserve and to protect the palmar cutaneous branch of the median nerve. Bipolar electrocautery was used for hemostasis.  The palmar aponeurosis was divided longitudinally to expose the underlying convergence of the volar carpal ligament and the thenar fascia. The transverse carpal ligament was exposed.  Under direct visualization the transverse carpal ligament was divided from its distal most aspect to the level of the volar wrist crease with care is to protect the median nerve, the ulnar neurovascular structures, the superficial palmar arch, and the thenar motor branch of the median nerve.  The distal 3 to 4 centimeters of the antebrachial forearm fascia were divided longitudinally under direct visualization using curved tenotomy scissors contiguous with the transverse carpal ligament incision site. The nerve was observed to be free of compression or constriction from the surrounding tissues throughout its course in the distal  forearm to the mid-palm.     The median nerve was noted to be intact and was without evidence of constrictive deformity.  There was no evidence of significant flexor tenosynovitis nor evidence of an intra-carpal canal mass.    Next the carpal tunnel incision was extended distally in a Brunner fashion to the base of the ring and middle fingers.  A palmar fasciectomy was performed of both of these digits.  Dissection was carried to the palmar aponeurosis which was thickened in a cord like fashion with 1 cord going to the ring finger and another cord going to the middle finger.  I 1st isolated the cord going to the middle finger.  I made sure that there was no digital nerve that was in proximity to this and that of cord was isolated.  After I was confident this was fully isolated I excised 3 cm of this hypertrophic cord to release the MP joint.  I then dissected distally and isolated the cord at the base of the proximal phalanx of the middle finger and released this to free the IP joint of the middle finger.  Care was taken to visualize the digital nerve on the ulnar side of the middle finger to make sure was protected.    Next I turned my attention to the ring finger.  The cord was dissected distally and it was isolated from any surrounding structures.  I released and excised 3 cm of this hypertrophic cord in the palm to release the MP joint.  I then released this into the base of the ring finger at the level of the proximal phalanx to release the PIP joint.    Next I dissected the radial digital nerve to the ring finger.  I found that the nerve had been transected at the level of the A1 pulley.  The proximal end had formed a neuroma had scarred down to the flexor tendon sheath.  Distally Wallerian degeneration had occurred of the distal stump.  I isolated both ends.  I excised the neuroma in trimmed the proximal stump back to healthy nerve.  This was to the branch point of the common and proper digital nerves.  In  order to isolate the radial digital nerve to the ring finger I would to perform an internal neurolysis of the median nerve more specifically the common digital nerve to the 3rd webspace.  The operating microscope was brought into the field and an internal neurolysis of this common digital nerve to the 3rd webspace was performed using an 18 gauge needle.  This was neurolysed back to the distal extent of the carpal tunnel.  There was a 5 cm nerve gap after the nerve ends were trimmed both proximally and distally.  An AxoGen 1-2 mm x 70 mm nerve graft was placed into the field.  It was sutured to either end of the radial digital nerve to the ring finger with 9 0 nylon suture under the operating microscope.  The ends were sealed with fibrin glue after the repair was performed.  Tourniquet was let down and the wound was copiously irrigated.  The skin was closed with 4-0 nylon suture in the palm.  The skin over the medial elbow was closed with 3-0 Monocryl and 3-0 nylon suture.  A drain was placed in the medial elbow incision.    Tourniquet was deflated. Patient was extubated without complications and transferred to the recovery room in stable condition.       Post-operative Plan:  Postoperative the patient will come to clinic in 2 weeks for wound check.  We will then get her into therapy for nerve gliding exercises and nighttime splinting for the Dupuytren's contractures.

## 2023-02-01 DIAGNOSIS — S64.494S: Primary | ICD-10-CM

## 2023-02-01 RX ORDER — OXYCODONE AND ACETAMINOPHEN 5; 325 MG/1; MG/1
1 TABLET ORAL EVERY 4 HOURS PRN
Qty: 28 TABLET | Refills: 0 | Status: SHIPPED | OUTPATIENT
Start: 2023-02-03 | End: 2023-02-10

## 2023-02-01 NOTE — TELEPHONE ENCOUNTER
Patient called and left a message requesting medication refill. Day #7 for her prescription is 02/04/2023. I put a message in the new order for the pharmacy to hold until day #7 from last prescription which is 02/04/2023.   Patient stated Super [address] on [address].

## 2023-02-01 NOTE — TELEPHONE ENCOUNTER
Refill request has been routed to Dr. Richards. I called patient and explained that Dr. Richards has signed off on the medications and it has been sent to the pharmacy. I explained to the patient that the pharamcy will not be able to give her the medication until 02/03/23 because that is day #7. Patient voiced a clear understanding and stated that she called a little early because her pharmacy takes a few days to fill a prescription.     Patient voiced a clear understanding.

## 2023-02-06 ENCOUNTER — TELEPHONE (OUTPATIENT)
Dept: ORTHOPEDICS | Facility: CLINIC | Age: 63
End: 2023-02-06
Payer: COMMERCIAL

## 2023-02-06 NOTE — TELEPHONE ENCOUNTER
02/03/23 @ 12:07 & 2:00 --Patient called after clinic was closed on Friday. I received this message on 02/06/23. Patient called and left a message stating that her elbow was bleeding. Patient states that she woke up and had blood from her elbow on her sheets.     02/06/23 @ 8:30AM - I called patient back. Patient states that it is currently no longer bleeding. 01/28/23 patient removed tube then 3-4 days later patients elbow began bleeding for 1 day. Patient staets that it was not a lot so she did not change bandage. Then on 02/02/23 patient states that she woke up to her elbow bleeding on her sheets. Patient states that the bandage was saturated so she went to [facility] for supplies and changed the bandage near her elbow. Patient states that she did not have any xero form so that part of the bandage she is missing. Patient denies any swelling, fever, or redness. Patient states that she has pictures. I requested that she send us the images on P21.     Patient called back and left a message stating that she is unable to send us images on P21.     Patients follow up appointment is on 2/15/23.     Is it ok for her to leave the incision dressed with no xero form? Do you want her to come in sooner or keep the scheduled follow up appointment?

## 2023-02-07 NOTE — TELEPHONE ENCOUNTER
02/07/23 @ 10:43AM   I called patient to inform her that per Dr. Richards she can replace the dressing as needed. Patient did not answer. I left a VM for her to call back.

## 2023-02-07 NOTE — TELEPHONE ENCOUNTER
Patient called back. Per Dr. Richards I instructed her to change bandage as needed and keep the same scheduled follow up appointment. I instructed patient to call if she has any major changes. Patient voiced a clear understanding.

## 2023-02-09 DIAGNOSIS — G56.01 CARPAL TUNNEL SYNDROME OF RIGHT WRIST: Primary | ICD-10-CM

## 2023-02-09 RX ORDER — OXYCODONE AND ACETAMINOPHEN 5; 325 MG/1; MG/1
1 TABLET ORAL EVERY 4 HOURS PRN
Qty: 28 TABLET | Refills: 0 | Status: SHIPPED | OUTPATIENT
Start: 2023-02-09 | End: 2023-02-16 | Stop reason: SDUPTHER

## 2023-02-09 NOTE — TELEPHONE ENCOUNTER
02/09/23 @ 2:03 PM -- Patient called and left message requesting a refill for pain medication.     Refill request has been routed to Dr. Richards.     02/09/23 @ 3:00 PM I called patient to explain that it has been routed to the doctor and they can check with there pharmacy within the next few hours. Patient did not answer. Left them a voice mail.

## 2023-02-10 NOTE — TELEPHONE ENCOUNTER
08:37am- I contacted the patient to let her know that the medication was sent to the pharmacy. Patient didn't answer, so I left a voice mail.

## 2023-02-15 ENCOUNTER — OFFICE VISIT (OUTPATIENT)
Dept: ORTHOPEDICS | Facility: CLINIC | Age: 63
End: 2023-02-15
Payer: COMMERCIAL

## 2023-02-15 DIAGNOSIS — G56.01 CARPAL TUNNEL SYNDROME OF RIGHT WRIST: Primary | ICD-10-CM

## 2023-02-15 PROCEDURE — 99024 PR POST-OP FOLLOW-UP VISIT: ICD-10-PCS | Mod: ,,, | Performed by: STUDENT IN AN ORGANIZED HEALTH CARE EDUCATION/TRAINING PROGRAM

## 2023-02-15 PROCEDURE — 99024 POSTOP FOLLOW-UP VISIT: CPT | Mod: ,,, | Performed by: STUDENT IN AN ORGANIZED HEALTH CARE EDUCATION/TRAINING PROGRAM

## 2023-02-16 ENCOUNTER — TELEPHONE (OUTPATIENT)
Dept: INTERNAL MEDICINE | Facility: CLINIC | Age: 63
End: 2023-02-16
Payer: COMMERCIAL

## 2023-02-16 DIAGNOSIS — G56.01 CARPAL TUNNEL SYNDROME OF RIGHT WRIST: ICD-10-CM

## 2023-02-16 RX ORDER — OXYCODONE AND ACETAMINOPHEN 5; 325 MG/1; MG/1
1 TABLET ORAL EVERY 4 HOURS PRN
Qty: 28 TABLET | Refills: 0 | Status: SHIPPED | OUTPATIENT
Start: 2023-02-16 | End: 2023-02-23

## 2023-02-16 NOTE — TELEPHONE ENCOUNTER
I called patient and informed her that the medication has been sent to her pharmacy. She can check with them in the next few hours. Patient voiced a clear understanding.

## 2023-02-17 NOTE — PROGRESS NOTES
Postop Clinic Note    Surgery:  Right carpal tunnel release, cubital tunnel release, radial digital nerve of the ring finger reconstruction with allograft 01/27/2023    History of present illness:    Patient is doing well today.  She does not have significant pain in the hand although it is controlled with medication.  No fevers or chills      Past Surgical History:   Procedure Laterality Date    ANTEGRADE INTRAMEDULLARY RODDING OF FEMUR Left     BREAST SURGERY      x 3 06/04/2022    BREAST SURGERY      12/08/22    CARPAL TUNNEL RELEASE Right 1/27/2023    Procedure: RELEASE, CARPAL TUNNEL;  Surgeon: Mike Richards MD;  Location: [facility];  Service: Orthopedics;  Laterality: Right;    CLOSED REDUCTION OF INJURY OF SHOULDER Left     DUPUYTREN CONTRACTURE RELEASE Right 1/27/2023    Procedure: RELEASE, DUPUYTREN CONTRACTURE;  Surgeon: Mike Richards MD;  Location: [facility] OR;  Service: Orthopedics;  Laterality: Right;    HAND SURGERY      3 years ago    MOUTH SURGERY      fx palate    removal minda of femur Left     REPAIR, NERVE USING ALLOGRAFT Right 1/27/2023    Procedure: REPAIR, NERVE USING ALLOGRAFT - Microscope, axogen 1-2 x 15 graft, vistaseal, micro instruments;  Surgeon: Mike Richards MD;  Location: [facility] OR;  Service: Orthopedics;  Laterality: Right;  Microscope, axogen 1-2 x 15 graft, vistaseal, micro instruments    ROTATOR CUFF REPAIR Left     bicep tendon repair    ULNAR NERVE TRANSPOSITION Right 1/27/2023    Procedure: TRANSPOSITION, NERVE, ULNAR - endoscopic;  Surgeon: Mike Richards MD;  Location: [facility];  Service: Orthopedics;  Laterality: Right;           Examination:    Vital Signs:  There were no vitals filed for this visit.    There is no height or weight on file to calculate BMI.    Constitution:   Well-developed, well nourished patient in no acute distress.  Neurological:   Alert and oriented x 3 and cooperative to examination.     Psychiatric/Behavior: Normal mental status.  Respiratory:   No  shortness of breath.  Eyes:    Extraoccular muscles intact  Skin:    No scars, rash or suspicious lesions.    MSK:   Right upper extremity:  Surgical incisions are all healing well with sutures in place no evidence of infection.  Two-point discrimination is 5 mm on the radial and ulnar side of all digits except for the ring finger.  The radial side of the ring finger is still greater than 15 mm.  Hand is warm well perfused    Imaging:   No new imaging     Assessment:  Status post above surgeries    Plan:  Her wounds look good.  I will take sutures out today.  I will get her into therapy for nerve gliding exercises.  I will see her back in about a month    Follow Up:  1 month  Xray at next visit:  None

## 2023-02-22 ENCOUNTER — TELEPHONE (OUTPATIENT)
Dept: ORTHOPEDICS | Facility: CLINIC | Age: 63
End: 2023-02-22
Payer: COMMERCIAL

## 2023-02-22 DIAGNOSIS — S64.494S: ICD-10-CM

## 2023-02-22 DIAGNOSIS — G56.01 CARPAL TUNNEL SYNDROME OF RIGHT WRIST: Primary | ICD-10-CM

## 2023-02-22 NOTE — TELEPHONE ENCOUNTER
Patient called to ask about the status of her OT. I sent in the order for her therapy with specific instructions per Dr. Richards to [facility]. I let the patient know that they should be reaching out to her by the end of the day. The patient voiced a clear understanding.

## 2023-02-23 ENCOUNTER — OFFICE VISIT (OUTPATIENT)
Dept: INTERNAL MEDICINE | Facility: CLINIC | Age: 63
End: 2023-02-23
Payer: COMMERCIAL

## 2023-02-23 VITALS
WEIGHT: 139 LBS | DIASTOLIC BLOOD PRESSURE: 82 MMHG | HEART RATE: 92 BPM | SYSTOLIC BLOOD PRESSURE: 132 MMHG | OXYGEN SATURATION: 98 % | BODY MASS INDEX: 24.63 KG/M2 | HEIGHT: 63 IN

## 2023-02-23 DIAGNOSIS — M25.512 LEFT SHOULDER PAIN, UNSPECIFIED CHRONICITY: Primary | ICD-10-CM

## 2023-02-23 DIAGNOSIS — F32.A DEPRESSION, UNSPECIFIED DEPRESSION TYPE: ICD-10-CM

## 2023-02-23 PROCEDURE — 99213 PR OFFICE/OUTPT VISIT, EST, LEVL III, 20-29 MIN: ICD-10-PCS | Mod: ,,, | Performed by: INTERNAL MEDICINE

## 2023-02-23 PROCEDURE — 99213 OFFICE O/P EST LOW 20 MIN: CPT | Mod: ,,, | Performed by: INTERNAL MEDICINE

## 2023-02-23 RX ORDER — ALPRAZOLAM 0.25 MG/1
0.25 TABLET ORAL DAILY PRN
Qty: 90 TABLET | Refills: 1 | Status: SHIPPED | OUTPATIENT
Start: 2023-02-23 | End: 2023-07-10 | Stop reason: SDUPTHER

## 2023-02-23 RX ORDER — ROSUVASTATIN CALCIUM 10 MG/1
10 TABLET, COATED ORAL NIGHTLY
COMMUNITY
Start: 2023-02-17 | End: 2023-12-11 | Stop reason: SDUPTHER

## 2023-02-23 NOTE — PROGRESS NOTES
Tony Sims MD        PATIENT NAME: Zandra Levy  : 1960  DATE: 23  MRN: [id number]      Billing Provider: Tony Sims MD  Level of Service: [place] OFFICE/OUTPT VISIT, EST, LEVL III, 20-29 MIN  Patient PCP Information       Provider PCP Type    Tony Sims MD General            Reason for Visit / Chief Complaint: Left Shoulder Pain       Update PCP  Update Chief Complaint         History of Present Illness / Problem Focused Workflow     Zandra Levy presents to the clinic with Left Shoulder Pain     Grace is here for issues he is having with the left shoulder and left elbow  She is having pain anteriorly in the left shoulder down her arm into the elbow  Been going on for 4-6 months.      Review of Systems   Review of Systems   Constitutional: Negative.    HENT: Negative.     Eyes: Negative.    Respiratory: Negative.     Cardiovascular: Negative.    Gastrointestinal: Negative.    Endocrine: Negative.    Genitourinary: Negative.    Musculoskeletal: Negative.    Integumentary:  Negative.   Neurological: Negative.    Psychiatric/Behavioral: Negative.        Medical / Social / Family History     Past Medical History:   Diagnosis Date    Anxiety disorder, unspecified     Herpes simplex     Osteopenia     Vitamin D deficiency        Past Surgical History:   Procedure Laterality Date    ANTEGRADE INTRAMEDULLARY RODDING OF FEMUR Left     BREAST SURGERY      x 3 2022    BREAST SURGERY      22    CARPAL TUNNEL RELEASE Right 2023    Procedure: RELEASE, CARPAL TUNNEL;  Surgeon: Mike Richards MD;  Location: [facility];  Service: Orthopedics;  Laterality: Right;    CLOSED REDUCTION OF INJURY OF SHOULDER Left     DUPUYTREN CONTRACTURE RELEASE Right 2023    Procedure: RELEASE, DUPUYTREN CONTRACTURE;  Surgeon: Mike Richards MD;  Location: [facility] OR;  Service: Orthopedics;  Laterality: Right;    HAND SURGERY      3 years ago    MOUTH SURGERY      fx palate    removal minda  of femur Left     REPAIR, NERVE USING ALLOGRAFT Right 1/27/2023    Procedure: REPAIR, NERVE USING ALLOGRAFT - Microscope, axogen 1-2 x 15 graft, vistaseal, micro instruments;  Surgeon: Mike Richards MD;  Location: [facility] OR;  Service: Orthopedics;  Laterality: Right;  Microscope, axogen 1-2 x 15 graft, vistaseal, micro instruments    ROTATOR CUFF REPAIR Left     bicep tendon repair    ULNAR NERVE TRANSPOSITION Right 1/27/2023    Procedure: TRANSPOSITION, NERVE, ULNAR - endoscopic;  Surgeon: Mike Richards MD;  Location: [facility] OR;  Service: Orthopedics;  Laterality: Right;       Social History  Ms. Levy  reports that she quit smoking about 9 years ago. Her smoking use included cigarettes. She has never used smokeless tobacco. She reports current alcohol use of about 2.0 standard drinks per week. She reports current drug use. Drug: Marijuana.    Family History  Ms.'s Levy  family history includes Cancer in her father and sister; Diabetes in her brother and mother; Heart attack in her brother and sister.    Medications and Allergies     Medications  Outpatient Medications Marked as Taking for the 2/23/23 encounter (Office Visit) with Tony Moore MD   Medication Sig Dispense Refill    ALPRAZolam (XANAX) 0.25 MG tablet Take 1 tablet (0.25 mg total) by mouth daily as needed for Insomnia or Anxiety. 90 tablet 1    cholecalciferol, vitamin D3, (VITAMIN D3) 25 mcg (1,000 unit) capsule Take 1,000 Units by mouth once daily.      EScitalopram oxalate (LEXAPRO) 10 MG tablet Take 1 tablet (10 mg total) by mouth once daily. (Patient taking differently: Take 10 mg by mouth every evening.) 90 tablet 3    fluticasone propionate (FLONASE) 50 mcg/actuation nasal spray 1 spray (50 mcg total) by Each Nostril route 2 (two) times daily. 11.1 mL 6    ibuprofen-famotidine (DUEXIS) 800-26.6 mg Tab Take 1 tablet by mouth nightly. 90 tablet 3    melatonin 5 mg Cap Take 1 capsule by mouth nightly.      montelukast (SINGULAIR) 10 mg  tablet Take 1 tablet (10 mg total) by mouth once daily. 90 tablet 3    NON FORMULARY MEDICATION Take 1 tablet by mouth 2 (two) times a day.      NON FORMULARY MEDICATION Take by mouth as needed.      rosuvastatin (CRESTOR) 10 MG tablet Take 10 mg by mouth every evening.      valACYclovir (VALTREX) 1000 MG tablet Take 1 tablet (1,000 mg total) by mouth 3 (three) times daily as needed (as need for outbreak). (Patient taking differently: Take 1,000 mg by mouth as needed (as need for outbreak).) 15 tablet 0    valsartan (DIOVAN) 80 MG tablet Take 1 tablet (80 mg total) by mouth once daily. (Patient taking differently: Take 80 mg by mouth nightly.) 90 tablet 3    [DISCONTINUED] famotidine (PEPCID) 20 MG tablet Take 20 mg by mouth 2 (two) times daily as needed.         Allergies  Review of patient's allergies indicates:   Allergen Reactions    Penicillins Shortness Of Breath     Pt has taken PCNs with no problems    Amoxicillin Diarrhea     Per patient     Codeine Hives       Physical Examination     Vitals:    02/23/23 1011   BP: 132/82   Pulse: 92     Physical Exam  Constitutional:       Appearance: Normal appearance.   HENT:      Head: Normocephalic and atraumatic.      Right Ear: Tympanic membrane normal.      Left Ear: Tympanic membrane normal.      Nose: Nose normal.      Mouth/Throat:      Mouth: Mucous membranes are moist.   Eyes:      Extraocular Movements: Extraocular movements intact.      Pupils: Pupils are equal, round, and reactive to light.   Cardiovascular:      Rate and Rhythm: Normal rate and regular rhythm.      Pulses: Normal pulses.   Pulmonary:      Effort: Pulmonary effort is normal.      Breath sounds: Normal breath sounds.   Abdominal:      General: Abdomen is flat. Bowel sounds are normal.      Palpations: Abdomen is soft.   Musculoskeletal:         General: Normal range of motion.        Arms:       Cervical back: Normal range of motion and neck supple.      Comments: Tenderness to palpation in  the left anterior shoulder into the left elbow.   Skin:     General: Skin is warm and dry.   Neurological:      General: No focal deficit present.      Mental Status: She is alert and oriented to person, place, and time.   Psychiatric:         Mood and Affect: Mood normal.         Behavior: Behavior normal.        Assessment and Plan (including Health Maintenance)      Problem List  Smart Sets  Document Outside HM   :    Plan:   Left shoulder pain, unspecified chronicity    Depression, unspecified depression type     Referral to physical therapy re-evaluation treatment   Refill           Health Maintenance Due   Topic Date Due    Cervical Cancer Screening  Never done    HIV Screening  Never done    TETANUS VACCINE  Never done    Mammogram  Never done       Problem List Items Addressed This Visit    None  Visit Diagnoses       Left shoulder pain, unspecified chronicity    -  Primary    Depression, unspecified depression type                Health Maintenance Topics with due status: Not Due       Topic Last Completion Date    Colorectal Cancer Screening 08/28/2018    Lipid Panel 11/01/2022       Future Appointments   Date Time Provider Department Center   3/15/2023  9:45 AM Mike Richards MD [facility]   12/20/2023 10:40 AM Tony Moore MD [place] [id number]            Signature:  Tony Moore MD  [facility]  [address] [facility] [id number]    Date of encounter: 2/23/23

## 2023-03-01 ENCOUNTER — TELEPHONE (OUTPATIENT)
Dept: ORTHOPEDICS | Facility: CLINIC | Age: 63
End: 2023-03-01
Payer: COMMERCIAL

## 2023-03-01 DIAGNOSIS — G56.01 CARPAL TUNNEL SYNDROME OF RIGHT WRIST: Primary | ICD-10-CM

## 2023-03-01 NOTE — TELEPHONE ENCOUNTER
Patient called requesting a refill of pain medication. I explained that I would route the request to Dr. Richards and give her a call once it is sent to her pharmacy.

## 2023-03-02 ENCOUNTER — PATIENT MESSAGE (OUTPATIENT)
Dept: ADMINISTRATIVE | Facility: HOSPITAL | Age: 63
End: 2023-03-02
Payer: COMMERCIAL

## 2023-03-02 DIAGNOSIS — G56.01 CARPAL TUNNEL SYNDROME OF RIGHT WRIST: Primary | ICD-10-CM

## 2023-03-02 DIAGNOSIS — S64.494S: ICD-10-CM

## 2023-03-02 RX ORDER — OXYCODONE AND ACETAMINOPHEN 5; 325 MG/1; MG/1
1 TABLET ORAL EVERY 4 HOURS PRN
Qty: 28 TABLET | Refills: 0 | Status: SHIPPED | OUTPATIENT
Start: 2023-03-02 | End: 2023-03-16 | Stop reason: SDUPTHER

## 2023-03-02 RX ORDER — OXYCODONE AND ACETAMINOPHEN 5; 325 MG/1; MG/1
1 TABLET ORAL EVERY 4 HOURS PRN
Status: CANCELLED | OUTPATIENT
Start: 2023-03-02

## 2023-03-02 NOTE — TELEPHONE ENCOUNTER
I called patient and informed her that Dr. Richards has sent the medication to the pharmacy so she can check with them. Patient voiced a clear understanding.

## 2023-03-03 ENCOUNTER — TELEPHONE (OUTPATIENT)
Dept: ORTHOPEDICS | Facility: CLINIC | Age: 63
End: 2023-03-03
Payer: COMMERCIAL

## 2023-03-03 NOTE — TELEPHONE ENCOUNTER
Spoke with [place] [address] pharmacy who wanted to make sure we were aware that the patients opioid harm risk score was 650 and that she takes xanax. Explained that this is for post op care not long term treatment.     I advised the patient that she should not take the pain medication and xanax together. Patient voiced a clear understanding.

## 2023-03-08 LAB — BCS RECOMMENDATION EXT: NORMAL

## 2023-03-09 ENCOUNTER — DOCUMENTATION ONLY (OUTPATIENT)
Dept: ADMINISTRATIVE | Facility: HOSPITAL | Age: 63
End: 2023-03-09
Payer: COMMERCIAL

## 2023-03-15 ENCOUNTER — OFFICE VISIT (OUTPATIENT)
Dept: ORTHOPEDICS | Facility: CLINIC | Age: 63
End: 2023-03-15
Payer: COMMERCIAL

## 2023-03-15 ENCOUNTER — PATIENT MESSAGE (OUTPATIENT)
Dept: ADMINISTRATIVE | Facility: HOSPITAL | Age: 63
End: 2023-03-15
Payer: COMMERCIAL

## 2023-03-15 VITALS — HEART RATE: 70 BPM | DIASTOLIC BLOOD PRESSURE: 81 MMHG | SYSTOLIC BLOOD PRESSURE: 136 MMHG

## 2023-03-15 DIAGNOSIS — M65.351 TRIGGER FINGER, RIGHT LITTLE FINGER: Primary | ICD-10-CM

## 2023-03-15 DIAGNOSIS — S64.494S: ICD-10-CM

## 2023-03-15 PROCEDURE — 20550 TENDON SHEATH: ICD-10-PCS | Mod: 79,RT,, | Performed by: STUDENT IN AN ORGANIZED HEALTH CARE EDUCATION/TRAINING PROGRAM

## 2023-03-15 PROCEDURE — 20550 NJX 1 TENDON SHEATH/LIGAMENT: CPT | Mod: 79,RT,, | Performed by: STUDENT IN AN ORGANIZED HEALTH CARE EDUCATION/TRAINING PROGRAM

## 2023-03-15 PROCEDURE — 99213 PR OFFICE/OUTPT VISIT, EST, LEVL III, 20-29 MIN: ICD-10-PCS | Mod: 25,24,, | Performed by: STUDENT IN AN ORGANIZED HEALTH CARE EDUCATION/TRAINING PROGRAM

## 2023-03-15 PROCEDURE — 99213 OFFICE O/P EST LOW 20 MIN: CPT | Mod: 25,24,, | Performed by: STUDENT IN AN ORGANIZED HEALTH CARE EDUCATION/TRAINING PROGRAM

## 2023-03-15 RX ORDER — BETAMETHASONE SODIUM PHOSPHATE AND BETAMETHASONE ACETATE 3; 3 MG/ML; MG/ML
6 INJECTION, SUSPENSION INTRA-ARTICULAR; INTRALESIONAL; INTRAMUSCULAR; SOFT TISSUE
Status: DISCONTINUED | OUTPATIENT
Start: 2023-03-15 | End: 2023-03-15 | Stop reason: HOSPADM

## 2023-03-15 RX ORDER — LIDOCAINE HYDROCHLORIDE 10 MG/ML
1 INJECTION INFILTRATION; PERINEURAL
Status: DISCONTINUED | OUTPATIENT
Start: 2023-03-15 | End: 2023-03-15 | Stop reason: HOSPADM

## 2023-03-15 RX ADMIN — BETAMETHASONE SODIUM PHOSPHATE AND BETAMETHASONE ACETATE 6 MG: 3; 3 INJECTION, SUSPENSION INTRA-ARTICULAR; INTRALESIONAL; INTRAMUSCULAR; SOFT TISSUE at 09:03

## 2023-03-15 RX ADMIN — LIDOCAINE HYDROCHLORIDE 1 ML: 10 INJECTION INFILTRATION; PERINEURAL at 09:03

## 2023-03-15 NOTE — PROGRESS NOTES
Postop Clinic Note    Surgery:  Right carpal tunnel release, cubital tunnel release, radial digital nerve of the ring finger reconstruction with allograft 01/27/2023    History of present illness:    Patient is doing well today.  She is doing therapy.  Her pain is controlled with medication.  No fevers or chills.  Her only complaint today is catching and locking of the right small finger with pain localized to the A1 pulley in the flexor tendon sheath right small finger.  Her sensation has improved since previous to surgery except for the ring finger on the radial side which still numb.      Past Surgical History:   Procedure Laterality Date    ANTEGRADE INTRAMEDULLARY RODDING OF FEMUR Left     BREAST SURGERY      x 3 06/04/2022    BREAST SURGERY      12/08/22    CARPAL TUNNEL RELEASE Right 1/27/2023    Procedure: RELEASE, CARPAL TUNNEL;  Surgeon: Mike Richards MD;  Location: [facility] OR;  Service: Orthopedics;  Laterality: Right;    CLOSED REDUCTION OF INJURY OF SHOULDER Left     DUPUYTREN CONTRACTURE RELEASE Right 1/27/2023    Procedure: RELEASE, DUPUYTREN CONTRACTURE;  Surgeon: Mike Richards MD;  Location: [facility] OR;  Service: Orthopedics;  Laterality: Right;    HAND SURGERY      3 years ago    MOUTH SURGERY      fx palate    removal minda of femur Left     REPAIR, NERVE USING ALLOGRAFT Right 1/27/2023    Procedure: REPAIR, NERVE USING ALLOGRAFT - Microscope, axogen 1-2 x 15 graft, vistaseal, micro instruments;  Surgeon: Mike Richards MD;  Location: [facility] OR;  Service: Orthopedics;  Laterality: Right;  Microscope, axogen 1-2 x 15 graft, vistaseal, micro instruments    ROTATOR CUFF REPAIR Left     bicep tendon repair    ULNAR NERVE TRANSPOSITION Right 1/27/2023    Procedure: TRANSPOSITION, NERVE, ULNAR - endoscopic;  Surgeon: Mike Richards MD;  Location: [facility] OR;  Service: Orthopedics;  Laterality: Right;           Examination:    Vital Signs:    Vitals:    03/15/23 0952   BP: 136/81   Pulse: 70   PainSc:   8        There is no height or weight on file to calculate BMI.    Constitution:   Well-developed, well nourished patient in no acute distress.  Neurological:   Alert and oriented x 3 and cooperative to examination.     Psychiatric/Behavior: Normal mental status.  Respiratory:   No shortness of breath.  Eyes:    Extraoccular muscles intact  Skin:    No scars, rash or suspicious lesions.    MSK:   Right upper extremity:  Surgical incisions are all healed no evidence of infection.  Two-point discrimination is 5 mm on the radial and ulnar side of all digits except for the ring finger.  The radial side of the ring finger is still greater than 15 mm.  She does have scar tissue forming in her palm.  She is able to flex down to 1 cm away from the distal palmar crease long 5 digits.  She can almost fully straighten her digits there is a slight flexion contracture due to the scar tissue forming in the palm.  There is tenderness to palpation of the A1 pulley of the right small finger.  Hand is warm well perfused    Imaging:   No new imaging     Assessment:  Status post above surgeries, right small trigger finger    Plan:  She should continue therapy for scar management and to restore flexion of all her digits.  Her sensation has improved except for the ring finger which we are still going to observe.  This will take several months to recover if at all.  I will give her an injection into the right small finger flexor tendon sheath today to treat her trigger finger.  I can see her back in about 2 months to check her progress    Follow Up:  2 months  Xray at next visit:  None

## 2023-03-15 NOTE — PROCEDURES
Tendon Sheath    Date/Time: 3/15/2023 9:45 AM  Performed by: Mike Richards MD  Authorized by: Mike Richards MD     Consent Done?:  Yes (Verbal)  Indications:  Pain  Timeout: prior to procedure the correct patient, procedure, and site was verified    Location:  Small finger  Site:  R small flexor tendon sheath  Ultrasonic guidance for needle placement?: No    Needle size:  25 G  Approach:  Volar  Medications:  1 mL LIDOcaine HCL 10 mg/ml (1%) 10 mg/mL (1 %); 6 mg betamethasone acetate-betamethasone sodium phosphate 6 mg/mL  Patient tolerance:  Patient tolerated the procedure well with no immediate complications

## 2023-03-16 DIAGNOSIS — S64.494S: ICD-10-CM

## 2023-03-16 RX ORDER — OXYCODONE AND ACETAMINOPHEN 5; 325 MG/1; MG/1
1 TABLET ORAL EVERY 4 HOURS PRN
Qty: 28 TABLET | Refills: 0 | Status: SHIPPED | OUTPATIENT
Start: 2023-03-16 | End: 2023-03-30 | Stop reason: SDUPTHER

## 2023-03-16 RX ORDER — MELOXICAM 15 MG/1
15 TABLET ORAL DAILY
Qty: 7 TABLET | Refills: 0 | Status: SHIPPED | OUTPATIENT
Start: 2023-03-16 | End: 2023-04-26

## 2023-03-16 NOTE — TELEPHONE ENCOUNTER
I called the patient to inform her that the medication has been sent to the pharmacy so she can check with them. I explained that we went down to 5mg of percocet to begin weaning her off. Patient voiced a clear understanding.

## 2023-03-16 NOTE — TELEPHONE ENCOUNTER
Patient requested refill of pain medication at follow up appointment yesterday.     Patient called this morning and left a message requesting mobic 15 mg as well. Patient states that she was prescribed this medication previously by a different provider and it helped. Patient states that she discussed mobic with Dr. Richards yesterday at the appointment.     I spoke to the patient and explained to her that I would route to Dr. Richards and let her know when he signs off on it.

## 2023-03-22 ENCOUNTER — TELEPHONE (OUTPATIENT)
Dept: ORTHOPEDICS | Facility: CLINIC | Age: 63
End: 2023-03-22
Payer: COMMERCIAL

## 2023-03-22 DIAGNOSIS — M65.351 TRIGGER FINGER, RIGHT LITTLE FINGER: Primary | ICD-10-CM

## 2023-03-22 NOTE — TELEPHONE ENCOUNTER
Patient called and left a  stating that she has 1 visit left of therapy that is authorized. In order for her to continue therapy she is in need of a new order.     Gretchen recommends that she continue therapy.     I call the patient and explained that I would send over a new order.

## 2023-03-30 DIAGNOSIS — S64.494S: ICD-10-CM

## 2023-03-30 RX ORDER — OXYCODONE AND ACETAMINOPHEN 5; 325 MG/1; MG/1
1 TABLET ORAL EVERY 6 HOURS PRN
Qty: 28 TABLET | Refills: 0 | Status: SHIPPED | OUTPATIENT
Start: 2023-03-30 | End: 2023-04-24 | Stop reason: SDUPTHER

## 2023-03-30 NOTE — TELEPHONE ENCOUNTER
I called the patient and informed her that Dr. Richards has singed off on her refill request so she can check with her pharmacy. Patient voiced a clear understanding.

## 2023-03-30 NOTE — TELEPHONE ENCOUNTER
Patient called requesting a refill. I informed her that I will route the request to Dr. Richards. I explained that it may take 24-48 hours for the new prescription to be sent to the pharmacy. I explained that I will call and inform her once it is signed and routed to the pharmacy. I confirmed the patients pharmacy. Patient voiced a clear understanding.

## 2023-04-03 ENCOUNTER — PATIENT MESSAGE (OUTPATIENT)
Dept: ADMINISTRATIVE | Facility: HOSPITAL | Age: 63
End: 2023-04-03
Payer: COMMERCIAL

## 2023-04-17 ENCOUNTER — PATIENT MESSAGE (OUTPATIENT)
Dept: ADMINISTRATIVE | Facility: HOSPITAL | Age: 63
End: 2023-04-17
Payer: COMMERCIAL

## 2023-04-24 DIAGNOSIS — S64.494S: ICD-10-CM

## 2023-04-24 RX ORDER — OXYCODONE AND ACETAMINOPHEN 5; 325 MG/1; MG/1
1 TABLET ORAL EVERY 8 HOURS PRN
Qty: 28 TABLET | Refills: 0 | Status: SHIPPED | OUTPATIENT
Start: 2023-04-24 | End: 2023-06-01 | Stop reason: SDUPTHER

## 2023-04-24 NOTE — TELEPHONE ENCOUNTER
I called the patient to inform her that Dr. Richards has singed off on her refill request so she can check with her pharmacy. No answer. Left VM.

## 2023-04-24 NOTE — TELEPHONE ENCOUNTER
Patient called requesting a refill of pain medicine.  I informed her that I will route the request to Dr. Richards. I explained that it may take 24-48 hours for the new prescription to be sent to the pharmacy. I explained that I will call and inform her once Dr. Richards signs off and it is routed to the pharmacy. I confirmed her pharmacy. Patient voiced a clear understanding.

## 2023-05-03 ENCOUNTER — OFFICE VISIT (OUTPATIENT)
Dept: ORTHOPEDICS | Facility: CLINIC | Age: 63
End: 2023-05-03
Payer: COMMERCIAL

## 2023-05-03 DIAGNOSIS — G56.01 CARPAL TUNNEL SYNDROME OF RIGHT WRIST: Primary | ICD-10-CM

## 2023-05-03 PROCEDURE — 99213 PR OFFICE/OUTPT VISIT, EST, LEVL III, 20-29 MIN: ICD-10-PCS | Mod: ,,, | Performed by: STUDENT IN AN ORGANIZED HEALTH CARE EDUCATION/TRAINING PROGRAM

## 2023-05-03 PROCEDURE — 99213 OFFICE O/P EST LOW 20 MIN: CPT | Mod: ,,, | Performed by: STUDENT IN AN ORGANIZED HEALTH CARE EDUCATION/TRAINING PROGRAM

## 2023-05-03 NOTE — PROGRESS NOTES
Postop Clinic Note    Surgery:  Right carpal tunnel release, cubital tunnel release, radial digital nerve of the ring finger reconstruction with allograft 01/27/2023    History of present illness:    Patient is doing well today.  She is still doing therapy.  Her sensation is improved and most of her fingers although the radial side of the ring finger still numb as expected.  She still has some pain at the base of the small finger with catching and locking.  The injection that I gave her last time did not help her more than 2 weeks      Past Surgical History:   Procedure Laterality Date    ANTEGRADE INTRAMEDULLARY RODDING OF FEMUR Left     BREAST SURGERY      x 3 06/04/2022    BREAST SURGERY      12/08/22    CARPAL TUNNEL RELEASE Right 1/27/2023    Procedure: RELEASE, CARPAL TUNNEL;  Surgeon: Mike Richards MD;  Location: [facility];  Service: Orthopedics;  Laterality: Right;    CLOSED REDUCTION OF INJURY OF SHOULDER Left     DUPUYTREN CONTRACTURE RELEASE Right 1/27/2023    Procedure: RELEASE, DUPUYTREN CONTRACTURE;  Surgeon: Mike Richards MD;  Location: [facility] OR;  Service: Orthopedics;  Laterality: Right;    HAND SURGERY      3 years ago    MOUTH SURGERY      fx palate    removal minda of femur Left     REPAIR, NERVE USING ALLOGRAFT Right 1/27/2023    Procedure: REPAIR, NERVE USING ALLOGRAFT - Microscope, axogen 1-2 x 15 graft, vistaseal, micro instruments;  Surgeon: Mike Richards MD;  Location: [facility];  Service: Orthopedics;  Laterality: Right;  Microscope, axogen 1-2 x 15 graft, vistaseal, micro instruments    ROTATOR CUFF REPAIR Left     bicep tendon repair    ULNAR NERVE TRANSPOSITION Right 1/27/2023    Procedure: TRANSPOSITION, NERVE, ULNAR - endoscopic;  Surgeon: Mike Richards MD;  Location: [facility];  Service: Orthopedics;  Laterality: Right;           Examination:    Vital Signs:    There were no vitals filed for this visit.      There is no height or weight on file to calculate BMI.    Constitution:    Well-developed, well nourished patient in no acute distress.  Neurological:   Alert and oriented x 3 and cooperative to examination.     Psychiatric/Behavior: Normal mental status.  Respiratory:   No shortness of breath.  Eyes:    Extraoccular muscles intact  Skin:    No scars, rash or suspicious lesions.    MSK:   Right upper extremity:  Surgical incisions are all healed no evidence of infection.  Two-point discrimination is 5 mm on the radial and ulnar side of all digits except for the ring finger.  The radial side of the ring finger is still greater than 15 mm.  There is a positive Tinel in the mid palm. She does have scar tissue forming in her palm.  She is able to flex down to 0.5 cm away from the distal palmar crease long 5 digits.  She can almost fully straighten her digits there is a slight flexion contracture due to the scar tissue forming in the palm.  There is tenderness to palpation of the A1 pulley of the right small finger.  Hand is warm well perfused    Imaging:   No new imaging     Assessment:  Status post above surgeries, right small trigger finger    Plan:  She should continue therapy for scar management and to restore flexion of all her digits.  Her sensation has improved except for the ring finger which we are still going to observe.  This will take several months to recover if at all.    I can see her back in about 3 months to check her progress    Follow Up:  3 months  Xray at next visit:  None

## 2023-05-17 ENCOUNTER — TELEPHONE (OUTPATIENT)
Dept: INTERNAL MEDICINE | Facility: CLINIC | Age: 63
End: 2023-05-17
Payer: COMMERCIAL

## 2023-05-17 DIAGNOSIS — I10 PRIMARY HYPERTENSION: ICD-10-CM

## 2023-05-17 RX ORDER — VALSARTAN 80 MG/1
80 TABLET ORAL DAILY
Qty: 90 TABLET | Refills: 3 | Status: SHIPPED | OUTPATIENT
Start: 2023-05-17

## 2023-05-17 NOTE — TELEPHONE ENCOUNTER
----- Message from May Murillo sent at 5/17/2023  9:46 AM CDT -----  Regarding: meds  Would like to speak to nurse, medicine concerns

## 2023-05-30 ENCOUNTER — PATIENT MESSAGE (OUTPATIENT)
Dept: ADMINISTRATIVE | Facility: HOSPITAL | Age: 63
End: 2023-05-30
Payer: COMMERCIAL

## 2023-05-31 DIAGNOSIS — S64.494S: ICD-10-CM

## 2023-05-31 DIAGNOSIS — G56.01 CARPAL TUNNEL SYNDROME OF RIGHT WRIST: Primary | ICD-10-CM

## 2023-05-31 RX ORDER — MELOXICAM 15 MG/1
15 TABLET ORAL DAILY
Qty: 7 TABLET | Refills: 0 | Status: SHIPPED | OUTPATIENT
Start: 2023-05-31 | End: 2023-06-07

## 2023-06-01 RX ORDER — OXYCODONE AND ACETAMINOPHEN 5; 325 MG/1; MG/1
1 TABLET ORAL EVERY 12 HOURS PRN
Qty: 14 TABLET | Refills: 0 | Status: SHIPPED | OUTPATIENT
Start: 2023-06-01 | End: 2023-06-08

## 2023-06-01 NOTE — TELEPHONE ENCOUNTER
I called the patient to inform her that Dr. Richards has signed off on her refill request so she can check with her pharmacy. No answer - LVM.

## 2023-06-01 NOTE — TELEPHONE ENCOUNTER
Pt called me back today stating that the Mobic gives her heart burn so she is unable to take that. Pt is requesting a refill of Percocet instead of the Mobic that was filled yesterday.     I informed her that I will route the request to Dr. Richards. I explained that it may take 24-48 hours for the new prescription to be sent to the pharmacy if he approves the medication. I explained that I will call and inform her once Dr. Richards approves or denies the refill. I confirmed her pharmacy. Patient voiced a clear understanding.        Pt is also c/o spasms & ring finger triggering. Per Dr. Richards I made the pt an appointment to come in for him to evaluate & possible injection.

## 2023-06-05 ENCOUNTER — TELEPHONE (OUTPATIENT)
Dept: INTERNAL MEDICINE | Facility: CLINIC | Age: 63
End: 2023-06-05
Payer: COMMERCIAL

## 2023-06-05 DIAGNOSIS — B00.9 HERPES SIMPLEX: Primary | Chronic | ICD-10-CM

## 2023-06-05 DIAGNOSIS — T78.40XD ALLERGY, SUBSEQUENT ENCOUNTER: ICD-10-CM

## 2023-06-05 RX ORDER — VALACYCLOVIR HYDROCHLORIDE 1 G/1
1000 TABLET, FILM COATED ORAL 3 TIMES DAILY
Qty: 90 TABLET | Refills: 11 | Status: SHIPPED | OUTPATIENT
Start: 2023-06-05 | End: 2023-09-26

## 2023-06-05 RX ORDER — MONTELUKAST SODIUM 10 MG/1
10 TABLET ORAL DAILY
Qty: 90 TABLET | Refills: 3 | Status: SHIPPED | OUTPATIENT
Start: 2023-06-05 | End: 2023-12-06 | Stop reason: SDUPTHER

## 2023-06-05 NOTE — TELEPHONE ENCOUNTER
She needs 7 days of Valtrex a 1000 mg 3 times a day for 21 days.  Make sure she has right amount of medication.

## 2023-06-05 NOTE — TELEPHONE ENCOUNTER
Pt stated looks like she has a shingles breakout on her legs and right breast, pt stated the area is extremely red and there's blisters pt started taking Vatrex on 6-4-23 she took 2 in the morning and 2 at night, and she also used triamzinolone 0.1%-80 grams twice daily. Is there anything else she should be doing are that you recommend her do. Please Advise......

## 2023-06-05 NOTE — TELEPHONE ENCOUNTER
----- Message from Christine Figueroa sent at 6/5/2023  9:48 AM CDT -----  Regarding: med advice  .Type:  Needs Medical Advice    Who Called: Pt  Symptoms (please be specific): Blister size of 50 cent piece, body aches, burning, itching, on left side of breast   How long has patient had these symptoms: 3 days  Pharmacy name and phone #:  [facility] - [place], [place] - [address]  Would the patient rather a call back or a response via [facility]? Call back  Best Call Back Number: [phone]  Additional Information: Pt has been taking valACYclovir (VALTREX) 1000 MG tablet QID and putting Triamcinolone-acetonide 0.1%, needs a nurse to f/u to makes sure she's doing the right treatment.

## 2023-06-07 ENCOUNTER — OFFICE VISIT (OUTPATIENT)
Dept: ORTHOPEDICS | Facility: CLINIC | Age: 63
End: 2023-06-07
Payer: COMMERCIAL

## 2023-06-07 VITALS — BODY MASS INDEX: 25.58 KG/M2 | WEIGHT: 139 LBS | HEIGHT: 62 IN

## 2023-06-07 DIAGNOSIS — S64.494S: Primary | ICD-10-CM

## 2023-06-07 PROCEDURE — 99213 PR OFFICE/OUTPT VISIT, EST, LEVL III, 20-29 MIN: ICD-10-PCS | Mod: ,,, | Performed by: STUDENT IN AN ORGANIZED HEALTH CARE EDUCATION/TRAINING PROGRAM

## 2023-06-07 PROCEDURE — 99213 OFFICE O/P EST LOW 20 MIN: CPT | Mod: ,,, | Performed by: STUDENT IN AN ORGANIZED HEALTH CARE EDUCATION/TRAINING PROGRAM

## 2023-06-07 NOTE — PROGRESS NOTES
"Postop Clinic Note    Surgery:  Right carpal tunnel release, cubital tunnel release, radial digital nerve of the ring finger reconstruction with allograft 01/27/2023    History of present illness:    Patient is doing well.  Her pain and sensation has been improving.  She is been using her hand more.  The sensation in the ring finger is also improving.  No fevers or chills.  No issues with the incision sites.      Past Surgical History:   Procedure Laterality Date    ANTEGRADE INTRAMEDULLARY RODDING OF FEMUR Left     BREAST SURGERY      x 3 06/04/2022    BREAST SURGERY      12/08/22    CARPAL TUNNEL RELEASE Right 1/27/2023    Procedure: RELEASE, CARPAL TUNNEL;  Surgeon: Mike Richards MD;  Location: [facility] OR;  Service: Orthopedics;  Laterality: Right;    CLOSED REDUCTION OF INJURY OF SHOULDER Left     DUPUYTREN CONTRACTURE RELEASE Right 1/27/2023    Procedure: RELEASE, DUPUYTREN CONTRACTURE;  Surgeon: Mike Richards MD;  Location: [facility] OR;  Service: Orthopedics;  Laterality: Right;    HAND SURGERY      3 years ago    MOUTH SURGERY      fx palate    removal minda of femur Left     REPAIR, NERVE USING ALLOGRAFT Right 1/27/2023    Procedure: REPAIR, NERVE USING ALLOGRAFT - Microscope, axogen 1-2 x 15 graft, vistaseal, micro instruments;  Surgeon: Mike Richards MD;  Location: [facility] OR;  Service: Orthopedics;  Laterality: Right;  Microscope, axogen 1-2 x 15 graft, vistaseal, micro instruments    ROTATOR CUFF REPAIR Left     bicep tendon repair    ULNAR NERVE TRANSPOSITION Right 1/27/2023    Procedure: TRANSPOSITION, NERVE, ULNAR - endoscopic;  Surgeon: Mike Richards MD;  Location: [facility];  Service: Orthopedics;  Laterality: Right;           Examination:    Vital Signs:    Vitals:    06/07/23 0947   Weight: 63 kg (139 lb)   Height: 5' 2" (1.575 m)         Body mass index is 25.42 kg/m².    Constitution:   Well-developed, well nourished patient in no acute distress.  Neurological:   Alert and oriented x 3 and cooperative to " examination.     Psychiatric/Behavior: Normal mental status.  Respiratory:   No shortness of breath.  Eyes:    Extraoccular muscles intact  Skin:    No scars, rash or suspicious lesions.    MSK:   Right upper extremity:  Surgical incisions are all healed no evidence of infection.  Two-point discrimination is 5 mm on the radial and ulnar side of all digits except for the ring finger.  The radial side of the ring finger is 10mm.  There is a positive Tinel advancing to the radial aspect of the ring finger.. She does have scar tissue forming in her palm.  She is now able to flex the distal palmar crease.  She can almost fully straighten her digits there is a slight flexion contracture due to the scar tissue forming in the palm.  There is tenderness to palpation of the A1 pulley of the right small finger.  Hand is warm well perfused    Imaging:   No new imaging     Assessment:  Status post above surgeries    Plan:  She is recovering nicely.  I explained that nerve recovery can still take over a year to happen.  She can continue using her hand as tolerated.  She can range her digits as tolerated.  She can continue doing therapy for scar massage and range of motion.  She can also be working on scar massage at home.  I think overall she is doing well with her recovery.  I can see her back in 3-4 months to see how she is doing with her nerve recovery    Follow Up:  3-4 months  Xray at next visit:  None

## 2023-06-26 ENCOUNTER — PATIENT MESSAGE (OUTPATIENT)
Dept: ADMINISTRATIVE | Facility: HOSPITAL | Age: 63
End: 2023-06-26
Payer: COMMERCIAL

## 2023-07-10 DIAGNOSIS — F32.A DEPRESSION, UNSPECIFIED DEPRESSION TYPE: ICD-10-CM

## 2023-07-10 DIAGNOSIS — M25.512 LEFT SHOULDER PAIN, UNSPECIFIED CHRONICITY: ICD-10-CM

## 2023-07-10 RX ORDER — ALPRAZOLAM 0.25 MG/1
0.25 TABLET ORAL DAILY PRN
Qty: 60 TABLET | Refills: 2 | Status: SHIPPED | OUTPATIENT
Start: 2023-07-10 | End: 2024-01-02

## 2023-09-08 DIAGNOSIS — M19.90 ARTHRITIS: ICD-10-CM

## 2023-09-08 RX ORDER — IBUPROFEN AND FAMOTIDINE 800; 26.6 MG/1; MG/1
1 TABLET, COATED ORAL NIGHTLY
Qty: 90 TABLET | Refills: 3 | Status: SHIPPED | OUTPATIENT
Start: 2023-09-08 | End: 2023-12-06 | Stop reason: SDUPTHER

## 2023-09-19 ENCOUNTER — PATIENT MESSAGE (OUTPATIENT)
Dept: ADMINISTRATIVE | Facility: HOSPITAL | Age: 63
End: 2023-09-19
Payer: COMMERCIAL

## 2023-09-20 ENCOUNTER — TELEPHONE (OUTPATIENT)
Dept: INTERNAL MEDICINE | Facility: CLINIC | Age: 63
End: 2023-09-20
Payer: COMMERCIAL

## 2023-09-20 DIAGNOSIS — Z12.4 CERVICAL CANCER SCREENING: Primary | ICD-10-CM

## 2023-09-20 NOTE — TELEPHONE ENCOUNTER
Contacted pt to order mammogram she stated that she had one done in August at [facility] I did request those records from them.

## 2023-09-20 NOTE — LETTER
AUTHORIZATION FOR RELEASE OF   CONFIDENTIAL INFORMATION    Dear [facility] Staff ,    We are seeing Zandra Levy, date of birth 1960, in the clinic at [place] [address] INTERNAL MEDICINE. Tony Moore MD is the patient's PCP. Zandra Levy has an outstanding lab/procedure at the time we reviewed her chart. In order to help keep her health information updated, she has authorized us to request the following medical record(s):        ( X )  MAMMOGRAM                                      (  )  COLONOSCOPY      (  )  PAP SMEAR                                          (  )  OUTSIDE LAB RESULTS     (  )  DEXA SCAN                                          (  )  EYE EXAM            (  )  FOOT EXAM                                          (  )  ENTIRE RECORD     (  )  OUTSIDE IMMUNIZATIONS                 (  )  _______________         Please fax records to Tony Moore MD, [phone].         Patient Name: Zandra Levy  : 1960  Patient Phone #: [phone]

## 2023-09-21 ENCOUNTER — TELEPHONE (OUTPATIENT)
Dept: INTERNAL MEDICINE | Facility: CLINIC | Age: 63
End: 2023-09-21
Payer: COMMERCIAL

## 2023-09-21 NOTE — TELEPHONE ENCOUNTER
----- Message from Tamiko Gerber LPN sent at 9/21/2023 11:55 AM CDT -----  Please see message. thanks  ----- Message -----  From: Ladonna Ramsay  Sent: 9/21/2023  11:02 AM CDT  To: [facility] Staff    .Type:  Needs Medical Advice    Who Called: Dr. Coker      Would the patient rather a call back? Yes    Best Call Back Number:  [phone]    Additional Information: due to the referral that was sent  Dr. Coker is not accepting annual patients at this time Thanks

## 2023-09-26 ENCOUNTER — OFFICE VISIT (OUTPATIENT)
Dept: INTERNAL MEDICINE | Facility: CLINIC | Age: 63
End: 2023-09-26
Payer: COMMERCIAL

## 2023-09-26 VITALS
DIASTOLIC BLOOD PRESSURE: 74 MMHG | HEIGHT: 62 IN | OXYGEN SATURATION: 98 % | SYSTOLIC BLOOD PRESSURE: 126 MMHG | HEART RATE: 87 BPM | BODY MASS INDEX: 25.58 KG/M2 | WEIGHT: 139 LBS

## 2023-09-26 DIAGNOSIS — I10 PRIMARY HYPERTENSION: Chronic | ICD-10-CM

## 2023-09-26 DIAGNOSIS — F41.9 ANXIETY DISORDER, UNSPECIFIED TYPE: Chronic | ICD-10-CM

## 2023-09-26 DIAGNOSIS — Z23 FLU VACCINE NEED: Primary | ICD-10-CM

## 2023-09-26 PROCEDURE — 99214 OFFICE O/P EST MOD 30 MIN: CPT | Mod: 25,,, | Performed by: INTERNAL MEDICINE

## 2023-09-26 PROCEDURE — 90471 FLU VACCINE (QUAD) GREATER THAN OR EQUAL TO 3YO PRESERVATIVE FREE IM: ICD-10-PCS | Mod: ,,, | Performed by: INTERNAL MEDICINE

## 2023-09-26 PROCEDURE — 90471 IMMUNIZATION ADMIN: CPT | Mod: ,,, | Performed by: INTERNAL MEDICINE

## 2023-09-26 PROCEDURE — 99214 PR OFFICE/OUTPT VISIT, EST, LEVL IV, 30-39 MIN: ICD-10-PCS | Mod: 25,,, | Performed by: INTERNAL MEDICINE

## 2023-09-26 PROCEDURE — 90686 FLU VACCINE (QUAD) GREATER THAN OR EQUAL TO 3YO PRESERVATIVE FREE IM: ICD-10-PCS | Mod: ,,, | Performed by: INTERNAL MEDICINE

## 2023-09-26 PROCEDURE — 90686 IIV4 VACC NO PRSV 0.5 ML IM: CPT | Mod: ,,, | Performed by: INTERNAL MEDICINE

## 2023-09-26 NOTE — LETTER
AUTHORIZATION FOR RELEASE OF   CONFIDENTIAL INFORMATION    Dear Dr. Valle Staff ,    We are seeing Zandra Levy, date of birth 1960, in the clinic at [place] [address] INTERNAL MEDICINE. Tony Moore MD is the patient's PCP. Zandra Levy has an outstanding lab/procedure at the time we reviewed her chart. In order to help keep her health information updated, she has authorized us to request the following medical record(s):        (  )  MAMMOGRAM                                      ( X )  COLONOSCOPY      (  )  PAP SMEAR                                          (  )  OUTSIDE LAB RESULTS     (  )  DEXA SCAN                                          (  )  EYE EXAM            (  )  FOOT EXAM                                          (  )  ENTIRE RECORD     (  )  OUTSIDE IMMUNIZATIONS                 (  )  _______________         Please fax records to Tony Moore MD, [phone].         Patient Name: Zandra Levy  : 1960  Patient Phone #: [phone]

## 2023-09-26 NOTE — LETTER
AUTHORIZATION FOR RELEASE OF   CONFIDENTIAL INFORMATION    Dear [facility] Staff,    We are seeing Zandra Levy, date of birth 1960, in the clinic at [place] [address] INTERNAL MEDICINE. Tony Moore MD is the patient's PCP. Zandra Levy has an outstanding lab/procedure at the time we reviewed her chart. In order to help keep her health information updated, she has authorized us to request the following medical record(s):        ( X )  MAMMOGRAM                                      (  )  COLONOSCOPY      (  )  PAP SMEAR                                          (  )  OUTSIDE LAB RESULTS     (  )  DEXA SCAN                                          (  )  EYE EXAM            (  )  FOOT EXAM                                          (  )  ENTIRE RECORD     (  )  OUTSIDE IMMUNIZATIONS                 (  )  _______________         Please fax records to Tony Moore MD, [phone].        Patient Name: Zandra Levy  : 1960  Patient Phone #: [phone]

## 2023-09-26 NOTE — ASSESSMENT & PLAN NOTE
Blood pressure well controlled.  I discussed that she could take both the medicines Crestor and Diovan together at night

## 2023-09-26 NOTE — PROGRESS NOTES
Tony Sims MD        PATIENT NAME: Zandra Levy  : 1960  DATE: 23  MRN: [id number]      Billing Provider: Tony Sims MD  Level of Service: HI OFFICE/OUTPT VISIT, EST, LEVL IV, 30-39 MIN  Patient PCP Information       Provider PCP Type    Tony Sims MD General            Reason for Visit / Chief Complaint: ADHD       Update PCP  Update Chief Complaint         History of Present Illness / Problem Focused Workflow     Zandra Levy presents to the clinic with ADHD     Patient is here and has multiple questions and issues   Persists concerned about bipolar disease and ADHD she is also having significant issues with grieving his loss of friends and family members   She recently had breast surgery about diet and exercise.        Review of Systems   Review of Systems   Constitutional: Negative.    HENT: Negative.     Eyes: Negative.    Respiratory: Negative.     Cardiovascular: Negative.    Gastrointestinal: Negative.    Endocrine: Negative.    Genitourinary: Negative.    Musculoskeletal: Negative.    Integumentary:  Negative.   Neurological: Negative.    Psychiatric/Behavioral: Negative.          Medical / Social / Family History     Past Medical History:   Diagnosis Date    Anxiety disorder, unspecified     Herpes simplex     Osteopenia     Vitamin D deficiency        Past Surgical History:   Procedure Laterality Date    ANTEGRADE INTRAMEDULLARY RODDING OF FEMUR Left     BREAST SURGERY      x 3 2022    BREAST SURGERY      22    CARPAL TUNNEL RELEASE Right 2023    Procedure: RELEASE, CARPAL TUNNEL;  Surgeon: Mike Richards MD;  Location: [facility];  Service: Orthopedics;  Laterality: Right;    CLOSED REDUCTION OF INJURY OF SHOULDER Left     DUPUYTREN CONTRACTURE RELEASE Right 2023    Procedure: RELEASE, DUPUYTREN CONTRACTURE;  Surgeon: Mike Richards MD;  Location: [facility] OR;  Service: Orthopedics;  Laterality: Right;    HAND SURGERY      3 years ago     MOUTH SURGERY      fx palate    removal minda of femur Left     REPAIR, NERVE USING ALLOGRAFT Right 1/27/2023    Procedure: REPAIR, NERVE USING ALLOGRAFT - Microscope, axogen 1-2 x 15 graft, vistaseal, micro instruments;  Surgeon: Mike Richards MD;  Location: [facility] OR;  Service: Orthopedics;  Laterality: Right;  Microscope, axogen 1-2 x 15 graft, vistaseal, micro instruments    ROTATOR CUFF REPAIR Left     bicep tendon repair    ULNAR NERVE TRANSPOSITION Right 1/27/2023    Procedure: TRANSPOSITION, NERVE, ULNAR - endoscopic;  Surgeon: Mike Richards MD;  Location: [facility] OR;  Service: Orthopedics;  Laterality: Right;       Social History  Ms. Levy  reports that she quit smoking about 9 years ago. Her smoking use included cigarettes. She has never used smokeless tobacco. She reports current alcohol use of about 2.0 standard drinks of alcohol per week. She reports current drug use. Drug: Marijuana.    Family History  Ms.'s Levy  family history includes Cancer in her father and sister; Diabetes in her brother and mother; Heart attack in her brother and sister.    Medications and Allergies     Medications  Outpatient Medications Marked as Taking for the 9/26/23 encounter (Office Visit) with Tony Moore MD   Medication Sig Dispense Refill    ALPRAZolam (XANAX) 0.25 MG tablet Take 1 tablet (0.25 mg total) by mouth daily as needed for Insomnia or Anxiety. 60 tablet 2    cholecalciferol, vitamin D3, (VITAMIN D3) 25 mcg (1,000 unit) capsule Take 1,000 Units by mouth once daily.      DUEXIS 800-26.6 mg Tab TAKE 1 TABLET NIGHTLY 90 tablet 3    EScitalopram oxalate (LEXAPRO) 10 MG tablet Take 1 tablet (10 mg total) by mouth once daily. (Patient taking differently: Take 10 mg by mouth every evening.) 90 tablet 3    fluticasone propionate (FLONASE) 50 mcg/actuation nasal spray 1 spray (50 mcg total) by Each Nostril route 2 (two) times daily. 11.1 mL 6    melatonin 5 mg Cap Take 1 capsule by mouth nightly.       montelukast (SINGULAIR) 10 mg tablet Take 1 tablet (10 mg total) by mouth once daily. 90 tablet 3    NON FORMULARY MEDICATION Take 1 tablet by mouth 2 (two) times a day.      NON FORMULARY MEDICATION Take by mouth as needed.      rosuvastatin (CRESTOR) 10 MG tablet Take 10 mg by mouth every evening.      valACYclovir (VALTREX) 1000 MG tablet Take 1 tablet (1,000 mg total) by mouth 3 (three) times daily as needed (as need for outbreak). (Patient taking differently: Take 1,000 mg by mouth as needed (as need for outbreak).) 15 tablet 0    valsartan (DIOVAN) 80 MG tablet Take 1 tablet (80 mg total) by mouth once daily. 90 tablet 3       Allergies  Review of patient's allergies indicates:   Allergen Reactions    Penicillins Shortness Of Breath     Pt has taken PCNs with no problems    Amoxicillin Diarrhea     Per patient     Codeine Hives    Naproxen Diarrhea       Physical Examination     Vitals:    09/26/23 1015   BP: 126/74   Pulse: 87     Physical Exam  Constitutional:       Appearance: Normal appearance.   HENT:      Head: Normocephalic and atraumatic.      Right Ear: Tympanic membrane normal.      Left Ear: Tympanic membrane normal.      Nose: Nose normal.      Mouth/Throat:      Mouth: Mucous membranes are moist.   Eyes:      Extraocular Movements: Extraocular movements intact.      Pupils: Pupils are equal, round, and reactive to light.   Cardiovascular:      Rate and Rhythm: Normal rate and regular rhythm.      Pulses: Normal pulses.   Pulmonary:      Effort: Pulmonary effort is normal.      Breath sounds: Normal breath sounds.   Abdominal:      General: Abdomen is flat. Bowel sounds are normal.      Palpations: Abdomen is soft.   Musculoskeletal:         General: Normal range of motion.      Cervical back: Normal range of motion and neck supple.   Skin:     General: Skin is warm and dry.   Neurological:      General: No focal deficit present.      Mental Status: She is alert and oriented to person, place, and  time.   Psychiatric:         Mood and Affect: Mood normal.         Behavior: Behavior normal.          Assessment and Plan (including Health Maintenance)      Problem List  Smart Sets  Document Outside HM   :    Plan:    ICD-10-CM ICD-9-CM   1. Anxiety disorder, unspecified type  F41.9 300.00   2. Primary hypertension  I10 401.9   Discussed referral for evaluation and testing to [facility] she will call them to make an appointment.  Flu vaccine given    Problem List Items Addressed This Visit          Psychiatric    Anxiety disorder, unspecified - Primary (Chronic)     Anxiety level stable            Cardiac/Vascular    Primary hypertension (Chronic)     Blood pressure well controlled.  I discussed that she could take both the medicines Crestor and Diovan together at night                   Health Maintenance Due   Topic Date Due    Cervical Cancer Screening  Never done    HIV Screening  Never done    TETANUS VACCINE  Never done    Mammogram  Never done    Hemoglobin A1c (Diabetic Prevention Screening)  Never done    Colorectal Cancer Screening  08/28/2023    Influenza Vaccine (1) 09/01/2023       Problem List Items Addressed This Visit          Psychiatric    Anxiety disorder, unspecified - Primary (Chronic)    Current Assessment & Plan     Anxiety level stable            Cardiac/Vascular    Primary hypertension (Chronic)    Current Assessment & Plan     Blood pressure well controlled.  I discussed that she could take both the medicines Crestor and Diovan together at night            Health Maintenance Topics with due status: Not Due       Topic Last Completion Date    Lipid Panel 11/01/2022       Future Appointments   Date Time Provider Department Center   10/9/2023  9:30 AM Mike Richards MD [facility]   12/12/2023  6:30 AM LAB, [facility] DRAW STATION [facility]   12/20/2023 10:40 AM Tony Moore MD [place] [id number]            Signature:  Tony Moore  MD [facility] MEDIC[facility]  [address]  [place] [id number]    Date of encounter: 9/26/23

## 2023-10-04 ENCOUNTER — PATIENT OUTREACH (OUTPATIENT)
Dept: ADMINISTRATIVE | Facility: HOSPITAL | Age: 63
End: 2023-10-04
Payer: COMMERCIAL

## 2023-10-04 NOTE — PROGRESS NOTES
Population Health Outreach.   The following record(s)  below were uploaded for Health Maintenance .    MAMMOGRAM SCREENING      03/08/23

## 2023-10-17 ENCOUNTER — PATIENT MESSAGE (OUTPATIENT)
Dept: ADMINISTRATIVE | Facility: HOSPITAL | Age: 63
End: 2023-10-17
Payer: COMMERCIAL

## 2023-10-23 ENCOUNTER — OFFICE VISIT (OUTPATIENT)
Dept: ORTHOPEDICS | Facility: CLINIC | Age: 63
End: 2023-10-23
Payer: COMMERCIAL

## 2023-10-23 VITALS — BODY MASS INDEX: 25.95 KG/M2 | HEIGHT: 62 IN | WEIGHT: 141 LBS

## 2023-10-23 DIAGNOSIS — M65.351 TRIGGER FINGER, RIGHT LITTLE FINGER: Primary | ICD-10-CM

## 2023-10-23 PROCEDURE — 20550 TENDON SHEATH: ICD-10-PCS | Mod: RT,,, | Performed by: STUDENT IN AN ORGANIZED HEALTH CARE EDUCATION/TRAINING PROGRAM

## 2023-10-23 PROCEDURE — 99213 OFFICE O/P EST LOW 20 MIN: CPT | Mod: 25,,, | Performed by: STUDENT IN AN ORGANIZED HEALTH CARE EDUCATION/TRAINING PROGRAM

## 2023-10-23 PROCEDURE — 99213 PR OFFICE/OUTPT VISIT, EST, LEVL III, 20-29 MIN: ICD-10-PCS | Mod: 25,,, | Performed by: STUDENT IN AN ORGANIZED HEALTH CARE EDUCATION/TRAINING PROGRAM

## 2023-10-23 PROCEDURE — 20550 NJX 1 TENDON SHEATH/LIGAMENT: CPT | Mod: RT,,, | Performed by: STUDENT IN AN ORGANIZED HEALTH CARE EDUCATION/TRAINING PROGRAM

## 2023-10-23 RX ORDER — BETAMETHASONE SODIUM PHOSPHATE AND BETAMETHASONE ACETATE 3; 3 MG/ML; MG/ML
6 INJECTION, SUSPENSION INTRA-ARTICULAR; INTRALESIONAL; INTRAMUSCULAR; SOFT TISSUE
Status: DISCONTINUED | OUTPATIENT
Start: 2023-10-23 | End: 2023-10-23 | Stop reason: HOSPADM

## 2023-10-23 RX ORDER — LIDOCAINE HYDROCHLORIDE 10 MG/ML
1 INJECTION INFILTRATION; PERINEURAL
Status: DISCONTINUED | OUTPATIENT
Start: 2023-10-23 | End: 2023-10-23 | Stop reason: HOSPADM

## 2023-10-23 RX ADMIN — BETAMETHASONE SODIUM PHOSPHATE AND BETAMETHASONE ACETATE 6 MG: 3; 3 INJECTION, SUSPENSION INTRA-ARTICULAR; INTRALESIONAL; INTRAMUSCULAR; SOFT TISSUE at 08:10

## 2023-10-23 RX ADMIN — LIDOCAINE HYDROCHLORIDE 1 ML: 10 INJECTION INFILTRATION; PERINEURAL at 08:10

## 2023-10-23 NOTE — PROCEDURES
Tendon Sheath    Date/Time: 10/23/2023 8:15 AM    Performed by: Mike Richards MD  Authorized by: Mike Richards MD    Consent Done?:  Yes (Verbal)  Indications:  Pain  Timeout: prior to procedure the correct patient, procedure, and site was verified    Location:  Small finger  Site:  R small flexor tendon sheath  Ultrasonic guidance for needle placement?: No    Needle size:  25 G  Approach:  Volar  Medications:  1 mL LIDOcaine HCL 10 mg/ml (1%) 10 mg/mL (1 %); 6 mg betamethasone acetate-betamethasone sodium phosphate 6 mg/mL  Patient tolerance:  Patient tolerated the procedure well with no immediate complications

## 2023-10-23 NOTE — PROGRESS NOTES
"Clinic Note    Surgery:  Right carpal tunnel release, cubital tunnel release, radial digital nerve of the ring finger reconstruction with allograft 01/27/2023    History of present illness:    Patient is doing well.   The sensation in the ring finger is also improving but it is not normal on the radial side. Her main issue today is pain, locking, catching of the little finger.       Past Surgical History:   Procedure Laterality Date    ANTEGRADE INTRAMEDULLARY RODDING OF FEMUR Left     BREAST SURGERY      x 3 06/04/2022    BREAST SURGERY      12/08/22    CARPAL TUNNEL RELEASE Right 1/27/2023    Procedure: RELEASE, CARPAL TUNNEL;  Surgeon: Mike Richards MD;  Location: [facility] OR;  Service: Orthopedics;  Laterality: Right;    CLOSED REDUCTION OF INJURY OF SHOULDER Left     DUPUYTREN CONTRACTURE RELEASE Right 1/27/2023    Procedure: RELEASE, DUPUYTREN CONTRACTURE;  Surgeon: Mike Richards MD;  Location: [facility] OR;  Service: Orthopedics;  Laterality: Right;    HAND SURGERY      3 years ago    MOUTH SURGERY      fx palate    removal minda of femur Left     REPAIR, NERVE USING ALLOGRAFT Right 1/27/2023    Procedure: REPAIR, NERVE USING ALLOGRAFT - Microscope, axogen 1-2 x 15 graft, vistaseal, micro instruments;  Surgeon: Mike Richards MD;  Location: [facility] OR;  Service: Orthopedics;  Laterality: Right;  Microscope, axogen 1-2 x 15 graft, vistaseal, micro instruments    ROTATOR CUFF REPAIR Left     bicep tendon repair    ULNAR NERVE TRANSPOSITION Right 1/27/2023    Procedure: TRANSPOSITION, NERVE, ULNAR - endoscopic;  Surgeon: Mike Richards MD;  Location: [facility] OR;  Service: Orthopedics;  Laterality: Right;           Examination:    Vital Signs:    Vitals:    10/23/23 0812   Weight: 64 kg (141 lb)   Height: 5' 2" (1.575 m)         Body mass index is 25.79 kg/m².    Constitution:   Well-developed, well nourished patient in no acute distress.  Neurological:   Alert and oriented x 3 and cooperative to examination.   "   Psychiatric/Behavior: Normal mental status.  Respiratory:   No shortness of breath.  Eyes:    Extraoccular muscles intact  Skin:    No scars, rash or suspicious lesions.    MSK:   Right upper extremity:  Surgical incisions are all healed no evidence of infection.  Two-point discrimination is 5 mm on the radial and ulnar side of all digits except for the ring finger.  The radial side of the ring finger is 10mm.  There is a positive Tinel advancing to the radial aspect of the ring finger.. She does have scar tissue forming in her palm.  She is now able to flex the distal palmar crease.  There is tenderness to palpation of the A1 pulley of the right small finger.  Hand is warm well perfused    Imaging:   No new imaging     Assessment:  Status post above surgeries    Plan:  Her main issue today is the right little finger trigger finger.  I will give her an injection into the right little finger flexor tendon sheath today.  She can continue doing therapy exercises at home.  She can follow up with me as needed    Follow Up:  As needed  Xray at next visit:  None

## 2023-11-27 DIAGNOSIS — T78.40XD ALLERGY, SUBSEQUENT ENCOUNTER: ICD-10-CM

## 2023-11-27 RX ORDER — FLUTICASONE PROPIONATE 50 MCG
1 SPRAY, SUSPENSION (ML) NASAL 2 TIMES DAILY
Qty: 11.1 ML | Refills: 6 | Status: SHIPPED | OUTPATIENT
Start: 2023-11-27

## 2023-12-04 DIAGNOSIS — F32.A DEPRESSION, UNSPECIFIED DEPRESSION TYPE: ICD-10-CM

## 2023-12-04 RX ORDER — ESCITALOPRAM OXALATE 10 MG/1
10 TABLET ORAL
Qty: 90 TABLET | Refills: 3 | Status: SHIPPED | OUTPATIENT
Start: 2023-12-04

## 2023-12-06 ENCOUNTER — TELEPHONE (OUTPATIENT)
Dept: INTERNAL MEDICINE | Facility: CLINIC | Age: 63
End: 2023-12-06
Payer: COMMERCIAL

## 2023-12-06 DIAGNOSIS — M19.90 ARTHRITIS: ICD-10-CM

## 2023-12-06 DIAGNOSIS — T78.40XD ALLERGY, SUBSEQUENT ENCOUNTER: ICD-10-CM

## 2023-12-06 RX ORDER — IBUPROFEN AND FAMOTIDINE 26.6; 8 MG/1; MG/1
1 TABLET ORAL NIGHTLY
Qty: 90 TABLET | Refills: 3 | Status: SHIPPED | OUTPATIENT
Start: 2023-12-06 | End: 2023-12-11 | Stop reason: SDUPTHER

## 2023-12-06 RX ORDER — MONTELUKAST SODIUM 10 MG/1
10 TABLET ORAL DAILY
Qty: 90 TABLET | Refills: 3 | Status: SHIPPED | OUTPATIENT
Start: 2023-12-06 | End: 2023-12-11 | Stop reason: SDUPTHER

## 2023-12-11 ENCOUNTER — TELEPHONE (OUTPATIENT)
Dept: INTERNAL MEDICINE | Facility: CLINIC | Age: 63
End: 2023-12-11
Payer: COMMERCIAL

## 2023-12-11 DIAGNOSIS — E78.5 HYPERLIPIDEMIA, UNSPECIFIED HYPERLIPIDEMIA TYPE: Primary | ICD-10-CM

## 2023-12-11 DIAGNOSIS — T78.40XD ALLERGY, SUBSEQUENT ENCOUNTER: ICD-10-CM

## 2023-12-11 DIAGNOSIS — M19.90 ARTHRITIS: ICD-10-CM

## 2023-12-11 RX ORDER — IBUPROFEN AND FAMOTIDINE 26.6; 8 MG/1; MG/1
1 TABLET ORAL NIGHTLY
Qty: 90 TABLET | Refills: 3 | Status: SHIPPED | OUTPATIENT
Start: 2023-12-11

## 2023-12-11 RX ORDER — ROSUVASTATIN CALCIUM 10 MG/1
10 TABLET, COATED ORAL NIGHTLY
Qty: 90 TABLET | Refills: 3 | Status: SHIPPED | OUTPATIENT
Start: 2023-12-11

## 2023-12-11 RX ORDER — MONTELUKAST SODIUM 10 MG/1
10 TABLET ORAL DAILY
Qty: 90 TABLET | Refills: 3 | Status: SHIPPED | OUTPATIENT
Start: 2023-12-11

## 2023-12-11 NOTE — TELEPHONE ENCOUNTER
----- Message from Errol Banegas sent at 12/11/2023  2:44 PM CST -----  .Type:  Needs Medical Advice    Who Called: Zandra  Symptoms (please be specific):    How long has patient had these symptoms:    Pharmacy name and phone #:    Would the patient rather a call back or a response via [facility]?   Best Call Back Number: [phone]  Additional Information: Requested to speak with the nurse about her medications.

## 2023-12-12 ENCOUNTER — LAB VISIT (OUTPATIENT)
Dept: LAB | Facility: HOSPITAL | Age: 63
End: 2023-12-12
Attending: INTERNAL MEDICINE
Payer: COMMERCIAL

## 2023-12-12 DIAGNOSIS — Z00.00 WELLNESS EXAMINATION: ICD-10-CM

## 2023-12-12 LAB
ALBUMIN SERPL-MCNC: 3.7 G/DL (ref 3.4–4.8)
ALBUMIN/GLOB SERPL: 1.5 RATIO (ref 1.1–2)
ALP SERPL-CCNC: 68 UNIT/L (ref 40–150)
ALT SERPL-CCNC: 20 UNIT/L (ref 0–55)
APPEARANCE UR: CLEAR
AST SERPL-CCNC: 20 UNIT/L (ref 5–34)
BACTERIA #/AREA URNS AUTO: ABNORMAL /HPF
BASOPHILS # BLD AUTO: 0.05 X10(3)/MCL
BASOPHILS NFR BLD AUTO: 1.1 %
BILIRUB SERPL-MCNC: 0.3 MG/DL
BILIRUB UR QL STRIP.AUTO: NEGATIVE
BUN SERPL-MCNC: 14 MG/DL (ref 9.8–20.1)
CALCIUM SERPL-MCNC: 9 MG/DL (ref 8.4–10.2)
CHLORIDE SERPL-SCNC: 108 MMOL/L (ref 98–107)
CHOLEST SERPL-MCNC: 188 MG/DL
CHOLEST/HDLC SERPL: 3 {RATIO} (ref 0–5)
CO2 SERPL-SCNC: 29 MMOL/L (ref 23–31)
COLOR UR AUTO: ABNORMAL
CREAT SERPL-MCNC: 0.63 MG/DL (ref 0.55–1.02)
DEPRECATED CALCIDIOL+CALCIFEROL SERPL-MC: 46.6 NG/ML (ref 30–80)
EOSINOPHIL # BLD AUTO: 0.07 X10(3)/MCL (ref 0–0.9)
EOSINOPHIL NFR BLD AUTO: 1.6 %
ERYTHROCYTE [DISTWIDTH] IN BLOOD BY AUTOMATED COUNT: 12.1 % (ref 11.5–17)
GFR SERPLBLD CREATININE-BSD FMLA CKD-EPI: >60 MLS/MIN/1.73/M2
GLOBULIN SER-MCNC: 2.4 GM/DL (ref 2.4–3.5)
GLUCOSE SERPL-MCNC: 87 MG/DL (ref 82–115)
GLUCOSE UR QL STRIP.AUTO: NORMAL
HCT VFR BLD AUTO: 39 % (ref 37–47)
HDLC SERPL-MCNC: 75 MG/DL (ref 35–60)
HGB BLD-MCNC: 12.7 G/DL (ref 12–16)
IMM GRANULOCYTES # BLD AUTO: 0.01 X10(3)/MCL (ref 0–0.04)
IMM GRANULOCYTES NFR BLD AUTO: 0.2 %
KETONES UR QL STRIP.AUTO: NEGATIVE
LDLC SERPL CALC-MCNC: 97 MG/DL (ref 50–140)
LEUKOCYTE ESTERASE UR QL STRIP.AUTO: 250
LYMPHOCYTES # BLD AUTO: 1.81 X10(3)/MCL (ref 0.6–4.6)
LYMPHOCYTES NFR BLD AUTO: 40.8 %
MCH RBC QN AUTO: 31.1 PG (ref 27–31)
MCHC RBC AUTO-ENTMCNC: 32.6 G/DL (ref 33–36)
MCV RBC AUTO: 95.4 FL (ref 80–94)
MONOCYTES # BLD AUTO: 0.46 X10(3)/MCL (ref 0.1–1.3)
MONOCYTES NFR BLD AUTO: 10.4 %
MUCOUS THREADS URNS QL MICRO: ABNORMAL /LPF
NEUTROPHILS # BLD AUTO: 2.04 X10(3)/MCL (ref 2.1–9.2)
NEUTROPHILS NFR BLD AUTO: 45.9 %
NITRITE UR QL STRIP.AUTO: NEGATIVE
NRBC BLD AUTO-RTO: 0 %
PH UR STRIP.AUTO: 6.5 [PH]
PLATELET # BLD AUTO: 233 X10(3)/MCL (ref 130–400)
PMV BLD AUTO: 8.9 FL (ref 7.4–10.4)
POTASSIUM SERPL-SCNC: 4.3 MMOL/L (ref 3.5–5.1)
PROT SERPL-MCNC: 6.1 GM/DL (ref 5.8–7.6)
PROT UR QL STRIP.AUTO: NEGATIVE
RBC # BLD AUTO: 4.09 X10(6)/MCL (ref 4.2–5.4)
RBC #/AREA URNS AUTO: ABNORMAL /HPF
RBC UR QL AUTO: NEGATIVE
SODIUM SERPL-SCNC: 142 MMOL/L (ref 136–145)
SP GR UR STRIP.AUTO: 1.02 (ref 1–1.03)
SQUAMOUS #/AREA URNS LPF: ABNORMAL /HPF
TRIGL SERPL-MCNC: 78 MG/DL (ref 37–140)
UROBILINOGEN UR STRIP-ACNC: NORMAL
VLDLC SERPL CALC-MCNC: 16 MG/DL
WBC # SPEC AUTO: 4.44 X10(3)/MCL (ref 4.5–11.5)
WBC #/AREA URNS AUTO: ABNORMAL /HPF

## 2023-12-12 PROCEDURE — 80061 LIPID PANEL: CPT

## 2023-12-12 PROCEDURE — 36415 COLL VENOUS BLD VENIPUNCTURE: CPT

## 2023-12-12 PROCEDURE — 81001 URINALYSIS AUTO W/SCOPE: CPT

## 2023-12-12 PROCEDURE — 80053 COMPREHEN METABOLIC PANEL: CPT

## 2023-12-12 PROCEDURE — 82306 VITAMIN D 25 HYDROXY: CPT

## 2023-12-12 PROCEDURE — 85025 COMPLETE CBC W/AUTO DIFF WBC: CPT

## 2023-12-13 ENCOUNTER — TELEPHONE (OUTPATIENT)
Dept: INTERNAL MEDICINE | Facility: CLINIC | Age: 63
End: 2023-12-13
Payer: COMMERCIAL

## 2023-12-19 PROBLEM — Z00.00 WELLNESS EXAMINATION: Status: ACTIVE | Noted: 2023-12-19

## 2023-12-19 PROBLEM — Z00.00 WELLNESS EXAMINATION: Chronic | Status: ACTIVE | Noted: 2023-12-19

## 2023-12-20 ENCOUNTER — OFFICE VISIT (OUTPATIENT)
Dept: INTERNAL MEDICINE | Facility: CLINIC | Age: 63
End: 2023-12-20
Payer: COMMERCIAL

## 2023-12-20 VITALS
WEIGHT: 144.38 LBS | DIASTOLIC BLOOD PRESSURE: 76 MMHG | OXYGEN SATURATION: 95 % | SYSTOLIC BLOOD PRESSURE: 122 MMHG | HEART RATE: 76 BPM | BODY MASS INDEX: 26.57 KG/M2 | HEIGHT: 62 IN

## 2023-12-20 DIAGNOSIS — Z12.11 SCREEN FOR COLON CANCER: Primary | ICD-10-CM

## 2023-12-20 DIAGNOSIS — I10 PRIMARY HYPERTENSION: Chronic | ICD-10-CM

## 2023-12-20 DIAGNOSIS — Z00.00 WELLNESS EXAMINATION: ICD-10-CM

## 2023-12-20 DIAGNOSIS — Z12.31 ENCOUNTER FOR SCREENING MAMMOGRAM FOR BREAST CANCER: ICD-10-CM

## 2023-12-20 PROCEDURE — 99396 PREV VISIT EST AGE 40-64: CPT | Mod: ,,, | Performed by: INTERNAL MEDICINE

## 2023-12-20 PROCEDURE — 99396 PR PREVENTIVE VISIT,EST,40-64: ICD-10-PCS | Mod: ,,, | Performed by: INTERNAL MEDICINE

## 2023-12-20 RX ORDER — CLOTRIMAZOLE AND BETAMETHASONE DIPROPIONATE 10; .64 MG/G; MG/G
CREAM TOPICAL
COMMUNITY
Start: 2023-10-11

## 2023-12-20 NOTE — PROGRESS NOTES
Tony Sims MD        PATIENT NAME: Zandra Levy  : 1960  DATE: 23  MRN: [id number]      Billing Provider: Tony Sims MD  Level of Service: [place] PREVENTIVE VISIT,EST,40-64  Patient PCP Information       Provider PCP Type    Tony Sims MD General            Reason for Visit / Chief Complaint: Annual Exam (Wellness/)       Update PCP  Update Chief Complaint         History of Present Illness / Problem Focused Workflow     Zandra Levy presents to the clinic with Annual Exam (Wellness/)     Zandra is here for annual wellness exam   She is doing well with no problems.        Review of Systems   Review of Systems   Constitutional: Negative.    HENT: Negative.     Eyes: Negative.    Respiratory: Negative.     Cardiovascular: Negative.    Gastrointestinal: Negative.    Endocrine: Negative.    Genitourinary: Negative.    Musculoskeletal: Negative.    Integumentary:  Negative.   Neurological: Negative.    Psychiatric/Behavioral: Negative.          Medical / Social / Family History     Past Medical History:   Diagnosis Date    Anxiety disorder, unspecified     Herpes simplex     Osteopenia     Vitamin D deficiency        Past Surgical History:   Procedure Laterality Date    ANTEGRADE INTRAMEDULLARY RODDING OF FEMUR Left     BREAST SURGERY      x 3 2022    BREAST SURGERY      22    CARPAL TUNNEL RELEASE Right 2023    Procedure: RELEASE, CARPAL TUNNEL;  Surgeon: Mike Richards MD;  Location: [facility];  Service: Orthopedics;  Laterality: Right;    CLOSED REDUCTION OF INJURY OF SHOULDER Left     DUPUYTREN CONTRACTURE RELEASE Right 2023    Procedure: RELEASE, DUPUYTREN CONTRACTURE;  Surgeon: Mike Richards MD;  Location: [facility] OR;  Service: Orthopedics;  Laterality: Right;    HAND SURGERY      3 years ago    MOUTH SURGERY      fx palate    removal minda of femur Left     REPAIR, NERVE USING ALLOGRAFT Right 2023    Procedure: REPAIR, NERVE USING ALLOGRAFT -  Microscope, axogen 1-2 x 15 graft, vistaseal, micro instruments;  Surgeon: Mike Richards MD;  Location: [facility] OR;  Service: Orthopedics;  Laterality: Right;  Microscope, axogen 1-2 x 15 graft, vistaseal, micro instruments    ROTATOR CUFF REPAIR Left     bicep tendon repair    ULNAR NERVE TRANSPOSITION Right 1/27/2023    Procedure: TRANSPOSITION, NERVE, ULNAR - endoscopic;  Surgeon: Mike Richards MD;  Location: [facility] OR;  Service: Orthopedics;  Laterality: Right;       Social History  Ms. Levy  reports that she quit smoking about 9 years ago. Her smoking use included cigarettes. She has never used smokeless tobacco. She reports current alcohol use of about 2.0 standard drinks of alcohol per week. She reports current drug use. Drug: Marijuana.    Family History  Ms.'s Levy  family history includes Cancer in her father and sister; Diabetes in her brother and mother; Heart attack in her brother and sister.    Medications and Allergies     Medications  Outpatient Medications Marked as Taking for the 12/20/23 encounter (Office Visit) with Tony Moore MD   Medication Sig Dispense Refill    ALPRAZolam (XANAX) 0.25 MG tablet Take 1 tablet (0.25 mg total) by mouth daily as needed for Insomnia or Anxiety. 60 tablet 2    cholecalciferol, vitamin D3, (VITAMIN D3) 25 mcg (1,000 unit) capsule Take 1,000 Units by mouth once daily.      clotrimazole-betamethasone 1-0.05% (LOTRISONE) cream Apply topically.      EScitalopram oxalate (LEXAPRO) 10 MG tablet TAKE 1 TABLET DAILY 90 tablet 3    fluticasone propionate (FLONASE) 50 mcg/actuation nasal spray 1 spray (50 mcg total) by Each Nostril route 2 (two) times daily. 11.1 mL 6    ibuprofen-famotidine (DUEXIS) 800-26.6 mg Tab Take 1 tablet by mouth every evening. 90 tablet 3    melatonin 5 mg Cap Take 1 capsule by mouth nightly.      montelukast (SINGULAIR) 10 mg tablet Take 1 tablet (10 mg total) by mouth once daily. 90 tablet 3    NON FORMULARY MEDICATION Take 1 tablet by  mouth 2 (two) times a day.      NON FORMULARY MEDICATION Take by mouth as needed.      rosuvastatin (CRESTOR) 10 MG tablet Take 1 tablet (10 mg total) by mouth every evening. 90 tablet 3    valACYclovir (VALTREX) 1000 MG tablet Take 1 tablet (1,000 mg total) by mouth 3 (three) times daily as needed (as need for outbreak). (Patient taking differently: Take 1,000 mg by mouth as needed (as need for outbreak).) 15 tablet 0    valsartan (DIOVAN) 80 MG tablet Take 1 tablet (80 mg total) by mouth once daily. 90 tablet 3       Allergies  Review of patient's allergies indicates:   Allergen Reactions    Penicillins Shortness Of Breath     Pt has taken PCNs with no problems    Amoxicillin Diarrhea     Per patient     Codeine Hives    Naproxen Diarrhea       Physical Examination     Vitals:    12/20/23 1049   BP: 122/76   Pulse: 76     Physical Exam  Constitutional:       Appearance: Normal appearance.   HENT:      Head: Normocephalic and atraumatic.      Right Ear: Tympanic membrane normal.      Left Ear: Tympanic membrane normal.      Nose: Nose normal.      Mouth/Throat:      Mouth: Mucous membranes are moist.   Eyes:      Extraocular Movements: Extraocular movements intact.      Pupils: Pupils are equal, round, and reactive to light.   Cardiovascular:      Rate and Rhythm: Normal rate and regular rhythm.      Pulses: Normal pulses.   Pulmonary:      Effort: Pulmonary effort is normal.      Breath sounds: Normal breath sounds.   Abdominal:      General: Abdomen is flat. Bowel sounds are normal.      Palpations: Abdomen is soft.   Musculoskeletal:         General: Normal range of motion.      Cervical back: Normal range of motion and neck supple.   Skin:     General: Skin is warm and dry.   Neurological:      General: No focal deficit present.      Mental Status: She is alert and oriented to person, place, and time.   Psychiatric:         Mood and Affect: Mood normal.         Behavior: Behavior normal.          Assessment and  Plan (including Health Maintenance)      Problem List  Smart Sets  Document Outside HM   :    Plan:    ICD-10-CM ICD-9-CM   1. Screen for colon cancer  Z12.11 V76.51   2. Wellness examination  Z00.00 V70.0   3. Primary hypertension  I10 401.9   4. Encounter for screening mammogram for breast cancer  Z12.31 V76.12       Problem List Items Addressed This Visit          Cardiac/Vascular    Primary hypertension (Chronic)     Blood pressure well controlled continue medication            Other    Wellness examination (Chronic)     Discussed all results stable continue all her medications she is on   Revisit 1 year annual wellness.          Other Visit Diagnoses       Screen for colon cancer    -  Primary    Relevant Orders    Ambulatory referral/consult to Gastroenterology    Encounter for screening mammogram for breast cancer        Relevant Orders    Mammo Digital Screening Bilat            Orders Placed This Encounter    Mammo Digital Screening Bilat    Ambulatory referral/consult to Gastroenterology         Health Maintenance Due   Topic Date Due    Cervical Cancer Screening  Never done    HIV Screening  Never done    TETANUS VACCINE  Never done    Hemoglobin A1c (Diabetic Prevention Screening)  Never done    RSV Vaccine (Age 60+ and Pregnant patients) (1 - 1-dose 60+ series) Never done    Colorectal Cancer Screening  08/28/2023    Mammogram  03/08/2024       Problem List Items Addressed This Visit          Cardiac/Vascular    Primary hypertension (Chronic)    Current Assessment & Plan     Blood pressure well controlled continue medication            Other    Wellness examination (Chronic)    Current Assessment & Plan     Discussed all results stable continue all her medications she is on   Revisit 1 year annual wellness.          Other Visit Diagnoses       Screen for colon cancer    -  Primary    Relevant Orders    Ambulatory referral/consult to Gastroenterology    Encounter for screening mammogram for breast cancer         Relevant Orders    Mammo Digital Screening Bilat            Health Maintenance Topics with due status: Not Due       Topic Last Completion Date    Lipid Panel 12/12/2023       No future appointments.         Signature:  Tony Sims MD  [facility]  [facility] [facility]  [place] [id number]    Date of encounter: 12/20/23

## 2023-12-20 NOTE — LETTER
AUTHORIZATION FOR RELEASE OF   CONFIDENTIAL INFORMATION    Dear  Staff,    We are seeing Zandra Levy, date of birth 1960, in the clinic at [place] [address] INTERNAL MEDICINE. Tony Moore MD is the patient's PCP. Zandra Levy has an outstanding lab/procedure at the time we reviewed her chart. In order to help keep her health information updated, she has authorized us to request the following medical record(s):        ( X )  MAMMOGRAM                                      (  )  COLONOSCOPY      ( X )  PAP SMEAR                                          (  )  OUTSIDE LAB RESULTS     (  )  DEXA SCAN                                          (  )  EYE EXAM            (  )  FOOT EXAM                                          (  )  ENTIRE RECORD     (  )  OUTSIDE IMMUNIZATIONS                 (  )  _______________         Please fax records to Tony Moore MD, [phone].              Patient Name: Zandra Levy  : 1960  Patient Phone #: [phone]

## 2023-12-20 NOTE — ASSESSMENT & PLAN NOTE
Discussed all results stable continue all her medications she is on   Revisit 1 year annual wellness.

## 2023-12-29 ENCOUNTER — PATIENT OUTREACH (OUTPATIENT)
Dept: ADMINISTRATIVE | Facility: HOSPITAL | Age: 63
End: 2023-12-29
Payer: COMMERCIAL

## 2023-12-29 NOTE — PROGRESS NOTES
The following record(s)  below were uploaded for Health Maintenance .         PAP SMEAR/HPV SCREENING  07/27/2022    Population Health Outreach.

## 2024-01-02 DIAGNOSIS — M25.512 LEFT SHOULDER PAIN, UNSPECIFIED CHRONICITY: ICD-10-CM

## 2024-01-02 RX ORDER — ALPRAZOLAM 0.25 MG/1
TABLET ORAL
Qty: 60 TABLET | Refills: 5 | Status: SHIPPED | OUTPATIENT
Start: 2024-01-02

## 2024-01-30 ENCOUNTER — OFFICE VISIT (OUTPATIENT)
Dept: INTERNAL MEDICINE | Facility: CLINIC | Age: 64
End: 2024-01-30
Payer: COMMERCIAL

## 2024-01-30 VITALS
DIASTOLIC BLOOD PRESSURE: 84 MMHG | RESPIRATION RATE: 18 BRPM | TEMPERATURE: 99 F | HEART RATE: 92 BPM | SYSTOLIC BLOOD PRESSURE: 128 MMHG | OXYGEN SATURATION: 99 % | BODY MASS INDEX: 27.1 KG/M2 | WEIGHT: 148.19 LBS

## 2024-01-30 DIAGNOSIS — J06.9 UPPER RESPIRATORY TRACT INFECTION, UNSPECIFIED TYPE: Primary | ICD-10-CM

## 2024-01-30 PROBLEM — F41.1 GENERALIZED ANXIETY DISORDER: Chronic | Status: ACTIVE | Noted: 2024-01-30

## 2024-01-30 PROBLEM — F41.1 GENERALIZED ANXIETY DISORDER: Status: ACTIVE | Noted: 2024-01-30

## 2024-01-30 LAB
FLUAV AG UPPER RESP QL IA.RAPID: NOT DETECTED
FLUBV AG UPPER RESP QL IA.RAPID: NOT DETECTED
RSV A 5' UTR RNA NPH QL NAA+PROBE: NOT DETECTED
SARS-COV-2 RNA RESP QL NAA+PROBE: NOT DETECTED

## 2024-01-30 PROCEDURE — 0241U COVID/RSV/FLU A&B PCR: CPT

## 2024-01-30 PROCEDURE — 99214 OFFICE O/P EST MOD 30 MIN: CPT | Mod: ,,,

## 2024-01-30 RX ORDER — BENZONATATE 100 MG/1
100 CAPSULE ORAL 3 TIMES DAILY PRN
Qty: 30 CAPSULE | Refills: 0 | Status: SHIPPED | OUTPATIENT
Start: 2024-01-30 | End: 2024-02-09

## 2024-01-30 NOTE — ASSESSMENT & PLAN NOTE
-obtain COVID/RSV/FLU PCR  -Encourage daily Antihistamine  -Okay to utilize Flonase BID  -continue Singulair daily  -Steam inhalation  -Tylenol/ibuprofen for body aches and low-grade temps  -initiate Zach Covarrubias p.r.n.  -okay to utilize Vit C , Vit D, and Zinc  -safety precautions/quarantine discussed   -notify clinic for any new or worsening symptoms

## 2024-01-30 NOTE — PROGRESS NOTES
liborio    Patient ID: Zandra Levy is a 63 y.o. female.    Chief Complaint: sinus infection  (Sinus infection symptoms started x3 days ago, pt states her  was diagnosed with a sinus infection  )    63-year-old female here today for requested visit.  Medical comorbidities include hypertension, hyperlipidemia, anxiety/depression, vitamin-D deficiency.  Today presents with new onset URI symptoms.  Of note, patient's  recently diagnosed with URI for which patient was helping care for him.  Patient was negative for COVID, RSV, and influenza.  She subsequently developed similar symptoms.    URI   This is a recurrent problem. The current episode started 1 to 4 weeks ago. The problem has been unchanged. There has been no fever. Associated symptoms include congestion, coughing, headaches, a plugged ear sensation, rhinorrhea and a sore throat. Pertinent negatives include no abdominal pain, chest pain, diarrhea, dysuria, nausea, rash, vomiting or wheezing. She has tried decongestant for the symptoms. The treatment provided mild relief.       MEDICAL HISTORY:    Past Medical History:   Diagnosis Date    Generalized anxiety disorder 01/30/2024    Herpes simplex     Osteopenia     Vitamin D deficiency       Past Surgical History:   Procedure Laterality Date    ANTEGRADE INTRAMEDULLARY RODDING OF FEMUR Left     BREAST SURGERY      x 3 06/04/2022    BREAST SURGERY      12/08/22    CARPAL TUNNEL RELEASE Right 1/27/2023    Procedure: RELEASE, CARPAL TUNNEL;  Surgeon: Mike Richards MD;  Location: [facility] OR;  Service: Orthopedics;  Laterality: Right;    CLOSED REDUCTION OF INJURY OF SHOULDER Left     DUPUYTREN CONTRACTURE RELEASE Right 1/27/2023    Procedure: RELEASE, DUPUYTREN CONTRACTURE;  Surgeon: Mike Richards MD;  Location: [facility] OR;  Service: Orthopedics;  Laterality: Right;    HAND SURGERY      3 years ago    MOUTH SURGERY      fx palate    removal minda of femur Left     REPAIR, NERVE USING ALLOGRAFT Right  1/27/2023    Procedure: REPAIR, NERVE USING ALLOGRAFT - Microscope, axogen 1-2 x 15 graft, vistaseal, micro instruments;  Surgeon: Mike Richards MD;  Location: [facility] OR;  Service: Orthopedics;  Laterality: Right;  Microscope, axogen 1-2 x 15 graft, vistaseal, micro instruments    ROTATOR CUFF REPAIR Left     bicep tendon repair    ULNAR NERVE TRANSPOSITION Right 1/27/2023    Procedure: TRANSPOSITION, NERVE, ULNAR - endoscopic;  Surgeon: Mike Richards MD;  Location: [facility] OR;  Service: Orthopedics;  Laterality: Right;      Social History     Tobacco Use    Smoking status: Former     Current packs/day: 0.00     Types: Cigarettes     Quit date: 2014     Years since quitting: 10.0    Smokeless tobacco: Never   Substance Use Topics    Alcohol use: Yes     Alcohol/week: 2.0 standard drinks of alcohol     Types: 2 Cans of beer per week     Comment: Social    Drug use: Yes     Types: Marijuana     Comment: Per patient medicial marijuana          Health Maintenance Due   Topic Date Due    HIV Screening  Never done    Hemoglobin A1c (Diabetic Prevention Screening)  Never done    RSV Vaccine (Age 60+ and Pregnant patients) (1 - 1-dose 60+ series) Never done    Colorectal Cancer Screening  08/28/2023    Mammogram  03/08/2024          Patient Care Team:  Tony Moore MD as PCP - General (Internal Medicine)  [facility]  Sherwin Jett MD as Consulting Physician (Obstetrics and Gynecology)  Errol Gaspar MD (Plastic Surgery)      Review of Systems   Constitutional:  Positive for chills. Negative for fatigue and fever.   HENT:  Positive for congestion, postnasal drip, rhinorrhea and sore throat. Negative for trouble swallowing.    Eyes:  Negative for redness and visual disturbance.   Respiratory:  Positive for cough. Negative for chest tightness, shortness of breath and wheezing.    Cardiovascular:  Negative for chest pain and palpitations.   Gastrointestinal:  Negative for  abdominal pain, constipation, diarrhea, nausea and vomiting.   Genitourinary:  Negative for dysuria, flank pain, frequency and urgency.   Musculoskeletal:  Negative for arthralgias, gait problem and myalgias.   Skin:  Negative for rash and wound.   Neurological:  Positive for headaches. Negative for facial asymmetry, speech difficulty and weakness.   All other systems reviewed and are negative.      Objective:   /84 (BP Location: Left arm, Patient Position: Sitting, BP Method: Small (Automatic))   Pulse 92   Temp 98.5 °F (36.9 °C) (Temporal)   Resp 18   Wt 67.2 kg (148 lb 3.2 oz)   SpO2 99%   BMI 27.10 kg/m²      Physical Exam  Constitutional:       General: She is not in acute distress.     Appearance: Normal appearance.   HENT:      Right Ear: Tympanic membrane, ear canal and external ear normal.      Left Ear: Tympanic membrane, ear canal and external ear normal.      Nose: Nose normal.      Mouth/Throat:      Mouth: Mucous membranes are moist.      Pharynx: Oropharynx is clear.   Eyes:      Extraocular Movements: Extraocular movements intact.      Conjunctiva/sclera: Conjunctivae normal.      Pupils: Pupils are equal, round, and reactive to light.   Cardiovascular:      Rate and Rhythm: Normal rate and regular rhythm.      Pulses: Normal pulses.      Heart sounds: Normal heart sounds. No murmur heard.     No gallop.   Pulmonary:      Effort: Pulmonary effort is normal.      Breath sounds: Normal breath sounds. No wheezing.   Abdominal:      General: Bowel sounds are normal. There is no distension.      Palpations: Abdomen is soft. There is no mass.      Tenderness: There is no abdominal tenderness. There is no guarding.   Musculoskeletal:         General: Normal range of motion.   Skin:     General: Skin is warm and dry.   Neurological:      Mental Status: She is alert. Mental status is at baseline.      Sensory: No sensory deficit.      Motor: No weakness.           Assessment:       ICD-10-CM  ICD-9-CM   1. Upper respiratory tract infection, unspecified type  J06.9 465.9        Plan:     Problem List Items Addressed This Visit          ENT    Upper respiratory tract infection - Primary     -obtain COVID/RSV/FLU PCR  -Encourage daily Antihistamine  -Okay to utilize Flonase BID  -continue Singulair daily  -Steam inhalation  -Tylenol/ibuprofen for body aches and low-grade temps  -initiate Tessalon Perles p.r.n.  -okay to utilize Vit C , Vit D, and Zinc  -safety precautions/quarantine discussed   -notify clinic for any new or worsening symptoms          Relevant Medications    benzonatate (TESSALON) 100 MG capsule    Other Relevant Orders    COVID/RSV/FLU A&B PCR          Follow up for Previously scheduled and PRN if need.   -plan specifics discussed above    Orders Placed This Encounter    COVID/RSV/FLU A&B PCR    benzonatate (TESSALON) 100 MG capsule        Medication List with Changes/Refills   New Medications    BENZONATATE (TESSALON) 100 MG CAPSULE    Take 1 capsule (100 mg total) by mouth 3 (three) times daily as needed for Cough.   Current Medications    ALPRAZOLAM (XANAX) 0.25 MG TABLET    TAKE 1 TABLET BY MOUTH ONCE DAILY AS NEEDED INSOMNIA OR FOR ANXIETY.    CHOLECALCIFEROL, VITAMIN D3, (VITAMIN D3) 25 MCG (1,000 UNIT) CAPSULE    Take 1,000 Units by mouth once daily.    CLOTRIMAZOLE-BETAMETHASONE 1-0.05% (LOTRISONE) CREAM    Apply topically.    ESCITALOPRAM OXALATE (LEXAPRO) 10 MG TABLET    TAKE 1 TABLET DAILY    FLUTICASONE PROPIONATE (FLONASE) 50 MCG/ACTUATION NASAL SPRAY    1 spray (50 mcg total) by Each Nostril route 2 (two) times daily.    IBUPROFEN-FAMOTIDINE (DUEXIS) 800-26.6 MG TAB    Take 1 tablet by mouth every evening.    MELATONIN 5 MG CAP    Take 1 capsule by mouth nightly.    MONTELUKAST (SINGULAIR) 10 MG TABLET    Take 1 tablet (10 mg total) by mouth once daily.    NON FORMULARY MEDICATION    Take 1 tablet by mouth 2 (two) times a day.    NON FORMULARY MEDICATION    Take by mouth as  needed.    ROSUVASTATIN (CRESTOR) 10 MG TABLET    Take 1 tablet (10 mg total) by mouth every evening.    VALACYCLOVIR (VALTREX) 1000 MG TABLET    Take 1 tablet (1,000 mg total) by mouth 3 (three) times daily as needed (as need for outbreak).    VALSARTAN (DIOVAN) 80 MG TABLET    Take 1 tablet (80 mg total) by mouth once daily.

## 2024-01-31 ENCOUNTER — TELEPHONE (OUTPATIENT)
Dept: INTERNAL MEDICINE | Facility: CLINIC | Age: 64
End: 2024-01-31
Payer: COMMERCIAL

## 2024-01-31 ENCOUNTER — PATIENT MESSAGE (OUTPATIENT)
Dept: INTERNAL MEDICINE | Facility: CLINIC | Age: 64
End: 2024-01-31
Payer: COMMERCIAL

## 2024-01-31 RX ORDER — AZITHROMYCIN 250 MG/1
TABLET, FILM COATED ORAL
Qty: 6 TABLET | Refills: 0 | Status: SHIPPED | OUTPATIENT
Start: 2024-01-31 | End: 2024-02-05

## 2024-01-31 NOTE — TELEPHONE ENCOUNTER
----- Message from Christine Figueroa sent at 1/31/2024  9:07 AM CST -----  Regarding: call back  .Type:  Patient Returning Call    Who Called:Pt  Who Left Message for Patient:Tamiko  Does the patient know what this is regarding?:Results  Would the patient rather a call back or a response via [facility]? Call back  Best Call Back Number:[phone]  Additional Information: Pt returning call

## 2024-01-31 NOTE — TELEPHONE ENCOUNTER
COVID/RSV/influenza PCR negative.  Prescription being sent for Z-Karrie to patient's pharmacy for sinusitis.  Medications Ordered This Encounter   Medications    azithromycin (Z-KARRIE) 250 MG tablet     Sig: Take 2 tablets by mouth on day 1; Take 1 tablet by mouth on days 2-5     Dispense:  6 tablet     Refill:  0

## 2024-01-31 NOTE — PROGRESS NOTES
Please notify patient most recent RSV/COVID/influenza PCR returned negative.  Prescription for Z-Maik sent to patient's pharmacy.  Continue treating symptomatically as discussed during yesterday's visit as well.

## 2024-01-31 NOTE — TELEPHONE ENCOUNTER
----- Message from JONY Muller sent at 1/31/2024  6:56 AM CST -----  Please notify patient most recent RSV/COVID/influenza PCR returned negative.  Prescription for Z-Maik sent to patient's pharmacy.  Continue treating symptomatically as discussed during yesterday's visit as well.

## 2024-02-19 DIAGNOSIS — B00.9 HERPES SIMPLEX: Primary | ICD-10-CM

## 2024-02-19 RX ORDER — VALACYCLOVIR HYDROCHLORIDE 1 G/1
1000 TABLET, FILM COATED ORAL 2 TIMES DAILY
Qty: 24 TABLET | Refills: 0 | Status: SHIPPED | OUTPATIENT
Start: 2024-02-19 | End: 2025-02-18

## 2024-02-19 NOTE — TELEPHONE ENCOUNTER
----- Message from Errol Banegas sent at 2/19/2024  2:38 PM CST -----  ,.Type:  Needs Medical Advice    Who Called: Zandra  Symptoms (please be specific):    How long has patient had these symptoms:    Pharmacy name and phone #:    Would the patient rather a call back or a response via [facility]?   Best Call Back Number: [phone]  Additional Information: She needed to speak with the nurse re: her medication she is taking for a virus on her legs.

## 2024-02-22 ENCOUNTER — TELEPHONE (OUTPATIENT)
Dept: INTERNAL MEDICINE | Facility: CLINIC | Age: 64
End: 2024-02-22
Payer: COMMERCIAL

## 2024-02-22 NOTE — TELEPHONE ENCOUNTER
----- Message from Marie Toney sent at 2/22/2024  8:08 AM CST -----  Regarding: Patient Call  .Type:  Patient Returning Call    Who Called:pt  Who Left Message for Patient:Elaine  Does the patient know what this is regarding?:return call   Would the patient rather a call back or a response via [facility]?   Best Call Back Number:[phone]  Additional Information: please return call

## 2024-02-26 ENCOUNTER — OFFICE VISIT (OUTPATIENT)
Dept: INTERNAL MEDICINE | Facility: CLINIC | Age: 64
End: 2024-02-26
Payer: COMMERCIAL

## 2024-02-26 DIAGNOSIS — B00.9 HERPES SIMPLEX: Primary | Chronic | ICD-10-CM

## 2024-02-26 PROCEDURE — 99213 OFFICE O/P EST LOW 20 MIN: CPT | Mod: ,,, | Performed by: INTERNAL MEDICINE

## 2024-02-26 NOTE — PROGRESS NOTES
Tony Sims MD        PATIENT NAME: Zandra Levy  : 1960  DATE: 24  MRN: [id number]      Billing Provider: Tony Sims MD  Level of Service: [place] OFFICE/OUTPT VISIT, EST, LEVL III, 20-29 MIN  Patient PCP Information       Provider PCP Type    Tony Sims MD General            Reason for Visit / Chief Complaint: discuss medications       Update PCP  Update Chief Complaint         History of Present Illness / Problem Focused Workflow     Zandra Levy presents to the clinic with discuss medications     Patient is here for issues with the medications   She brings in 4 different medicine bottles for her Valtrex   Normally she takes a 1000 mg 2 twice a day for 1 day when she has an outbreak   She is trying to get refills she was 500 mg and different generic states           Review of Systems   Review of Systems   Constitutional: Negative.    HENT: Negative.     Eyes: Negative.    Respiratory: Negative.     Cardiovascular: Negative.    Gastrointestinal: Negative.    Endocrine: Negative.    Genitourinary: Negative.    Musculoskeletal: Negative.    Integumentary:  Negative.   Neurological: Negative.    Psychiatric/Behavioral: Negative.          Medical / Social / Family History     Past Medical History:   Diagnosis Date    Generalized anxiety disorder 2024    Herpes simplex     Osteopenia     Vitamin D deficiency        Past Surgical History:   Procedure Laterality Date    ANTEGRADE INTRAMEDULLARY RODDING OF FEMUR Left     BREAST SURGERY      x 3 2022    BREAST SURGERY      22    CARPAL TUNNEL RELEASE Right 2023    Procedure: RELEASE, CARPAL TUNNEL;  Surgeon: Mike Richards MD;  Location: [facility] OR;  Service: Orthopedics;  Laterality: Right;    CLOSED REDUCTION OF INJURY OF SHOULDER Left     DUPUYTREN CONTRACTURE RELEASE Right 2023    Procedure: RELEASE, DUPUYTREN CONTRACTURE;  Surgeon: Mike Richards MD;  Location: [facility] OR;  Service: Orthopedics;   Laterality: Right;    HAND SURGERY      3 years ago    MOUTH SURGERY      fx palate    removal minda of femur Left     REPAIR, NERVE USING ALLOGRAFT Right 1/27/2023    Procedure: REPAIR, NERVE USING ALLOGRAFT - Microscope, axogen 1-2 x 15 graft, vistaseal, micro instruments;  Surgeon: Mike Richards MD;  Location: [facility] OR;  Service: Orthopedics;  Laterality: Right;  Microscope, axogen 1-2 x 15 graft, vistaseal, micro instruments    ROTATOR CUFF REPAIR Left     bicep tendon repair    ULNAR NERVE TRANSPOSITION Right 1/27/2023    Procedure: TRANSPOSITION, NERVE, ULNAR - endoscopic;  Surgeon: Mike Richards MD;  Location: [facility] OR;  Service: Orthopedics;  Laterality: Right;       Social History  Ms. Levy  reports that she quit smoking about 10 years ago. Her smoking use included cigarettes. She has never used smokeless tobacco. She reports current alcohol use of about 2.0 standard drinks of alcohol per week. She reports current drug use. Drug: Marijuana.    Family History  Ms.'s Levy  family history includes Cancer in her father and sister; Diabetes in her brother and mother; Heart attack in her brother and sister.    Medications and Allergies     Medications  Outpatient Medications Marked as Taking for the 2/26/24 encounter (Office Visit) with Tony Moore MD   Medication Sig Dispense Refill    ALPRAZolam (XANAX) 0.25 MG tablet TAKE 1 TABLET BY MOUTH ONCE DAILY AS NEEDED INSOMNIA OR FOR ANXIETY. 60 tablet 5    cholecalciferol, vitamin D3, (VITAMIN D3) 25 mcg (1,000 unit) capsule Take 1,000 Units by mouth once daily.      clotrimazole-betamethasone 1-0.05% (LOTRISONE) cream Apply topically.      EScitalopram oxalate (LEXAPRO) 10 MG tablet TAKE 1 TABLET DAILY 90 tablet 3    fluticasone propionate (FLONASE) 50 mcg/actuation nasal spray 1 spray (50 mcg total) by Each Nostril route 2 (two) times daily. 11.1 mL 6    ibuprofen-famotidine (DUEXIS) 800-26.6 mg Tab Take 1 tablet by mouth every evening. 90 tablet 3     melatonin 5 mg Cap Take 1 capsule by mouth nightly.      montelukast (SINGULAIR) 10 mg tablet Take 1 tablet (10 mg total) by mouth once daily. 90 tablet 3    NON FORMULARY MEDICATION Take 1 tablet by mouth 2 (two) times a day.      NON FORMULARY MEDICATION Take by mouth as needed.      rosuvastatin (CRESTOR) 10 MG tablet Take 1 tablet (10 mg total) by mouth every evening. 90 tablet 3    valsartan (DIOVAN) 80 MG tablet Take 1 tablet (80 mg total) by mouth once daily. 90 tablet 3       Allergies  Review of patient's allergies indicates:   Allergen Reactions    Penicillins Shortness Of Breath     Pt has taken PCNs with no problems    Amoxicillin Diarrhea     Per patient     Amoxicillin (bulk)     Codeine Hives    Codeine phosphate (bulk)     Naproxen Diarrhea       Physical Examination     Vitals:    02/26/24 1448   BP: (P) 138/70   Pulse: (P) 97     Physical Exam  Constitutional:       Appearance: Normal appearance.   HENT:      Head: Normocephalic and atraumatic.      Right Ear: Tympanic membrane normal.      Left Ear: Tympanic membrane normal.      Nose: Nose normal.      Mouth/Throat:      Mouth: Mucous membranes are moist.   Eyes:      Extraocular Movements: Extraocular movements intact.      Pupils: Pupils are equal, round, and reactive to light.   Cardiovascular:      Rate and Rhythm: Normal rate and regular rhythm.      Pulses: Normal pulses.   Pulmonary:      Effort: Pulmonary effort is normal.      Breath sounds: Normal breath sounds.   Abdominal:      General: Abdomen is flat. Bowel sounds are normal.      Palpations: Abdomen is soft.   Musculoskeletal:         General: Normal range of motion.      Cervical back: Normal range of motion and neck supple.   Skin:     General: Skin is warm and dry.   Neurological:      General: No focal deficit present.      Mental Status: She is alert and oriented to person, place, and time.   Psychiatric:         Mood and Affect: Mood normal.         Behavior: Behavior normal.           Assessment and Plan (including Health Maintenance)      Problem List  Smart Sets  Document Outside HM   :    Plan:    ICD-10-CM ICD-9-CM   1. Herpes simplex  B00.9 054.9       Problem List Items Addressed This Visit          ID    Herpes simplex - Primary (Chronic)     Discard all be  prescriptions for Valtrex   Agree with her dosage of 2000 mg twice a day for 1 day with a outbreak   Two OK to have 30 tablets dispensed for her to use as directed.                   Health Maintenance Due   Topic Date Due    HIV Screening  Never done    Hemoglobin A1c (Diabetic Prevention Screening)  Never done    RSV Vaccine (Age 60+ and Pregnant patients) (1 - 1-dose 60+ series) Never done    Colorectal Cancer Screening  2023    Mammogram  2024       Problem List Items Addressed This Visit          ID    Herpes simplex - Primary (Chronic)    Current Assessment & Plan     Discard all be  prescriptions for Valtrex   Agree with her dosage of 2000 mg twice a day for 1 day with a outbreak   Two OK to have 30 tablets dispensed for her to use as directed.            Health Maintenance Topics with due status: Not Due       Topic Last Completion Date    Cervical Cancer Screening 2022    TETANUS VACCINE 2023    Lipid Panel 2023       Future Appointments   Date Time Provider Department Center   2025 10:40 AM Tony Moore MD [place] [address]            Signature:  Tony Moore MD  [facility]  [address] [place] [id number]    Date of encounter: 24

## 2024-03-25 PROBLEM — Z00.00 WELLNESS EXAMINATION: Chronic | Status: RESOLVED | Noted: 2023-12-19 | Resolved: 2024-03-25

## 2024-05-12 DIAGNOSIS — I10 PRIMARY HYPERTENSION: ICD-10-CM

## 2024-05-13 RX ORDER — VALSARTAN 80 MG/1
80 TABLET ORAL
Qty: 90 TABLET | Refills: 3 | Status: SHIPPED | OUTPATIENT
Start: 2024-05-13

## 2024-06-24 ENCOUNTER — TELEPHONE (OUTPATIENT)
Dept: INTERNAL MEDICINE | Facility: CLINIC | Age: 64
End: 2024-06-24
Payer: COMMERCIAL

## 2024-06-24 DIAGNOSIS — I10 PRIMARY HYPERTENSION: ICD-10-CM

## 2024-06-24 DIAGNOSIS — E78.5 HYPERLIPIDEMIA, UNSPECIFIED HYPERLIPIDEMIA TYPE: ICD-10-CM

## 2024-06-24 DIAGNOSIS — R21 RASH: Primary | ICD-10-CM

## 2024-06-24 RX ORDER — VALSARTAN 80 MG/1
80 TABLET ORAL DAILY
Qty: 90 TABLET | Refills: 3 | Status: SHIPPED | OUTPATIENT
Start: 2024-06-24

## 2024-06-24 RX ORDER — ROSUVASTATIN CALCIUM 10 MG/1
10 TABLET, COATED ORAL NIGHTLY
Qty: 90 TABLET | Refills: 3 | Status: SHIPPED | OUTPATIENT
Start: 2024-06-24 | End: 2024-06-24 | Stop reason: SDUPTHER

## 2024-06-24 RX ORDER — ROSUVASTATIN CALCIUM 10 MG/1
10 TABLET, COATED ORAL NIGHTLY
Qty: 30 TABLET | Refills: 0 | Status: SHIPPED | OUTPATIENT
Start: 2024-06-24

## 2024-06-24 NOTE — TELEPHONE ENCOUNTER
----- Message from Fawn Senior sent at 6/24/2024  1:53 PM CDT -----  .Type:  Patient Returning Call    Who Called:pt  Who Left Message for Patient:pt  Does the patient know what this is regarding?:valsartan (DIOVAN) 80 MG tablet  Would the patient rather a call back or a response via [facility]?   Best Call Back Number:[phone]  Additional Information: Please send Dr Alonso's on the prescription for valsartan (DIOVAN) 80 MG tablet  Patient stats that this need to be on the prescription each time it is sent it. Please resend with this.     .Type:  Patient Returning Call    Who Called:pt  Who Left Message for Patient:pt  Does the patient know what this is regarding?:rosuvastatin (CRESTOR) 10 MG tablet  Would the patient rather a call back or a response via [facility]?   Best Call Back Number:[phone]  Additional Information: Please send Dr Medinas and [facility] for rosuvastatin (CRESTOR) 10 MG tablet   Patient stats that this need to be on the prescription each time it is sent it.

## 2024-06-24 NOTE — TELEPHONE ENCOUNTER
Patient states she was mowing her yard and she thinks she went around mold and it affected her nose and mouth. Inside her nose is cracked and dry, she needs an antibiotic ointment to help with this.

## 2024-06-25 RX ORDER — MUPIROCIN 20 MG/G
OINTMENT TOPICAL 2 TIMES DAILY
Qty: 30 G | Refills: 1 | Status: SHIPPED | OUTPATIENT
Start: 2024-06-25

## 2024-06-25 RX ORDER — MUPIROCIN 20 MG/G
OINTMENT TOPICAL 2 TIMES DAILY
Qty: 15 G | Refills: 0 | Status: SHIPPED | OUTPATIENT
Start: 2024-06-25

## 2024-07-23 DIAGNOSIS — M25.512 LEFT SHOULDER PAIN, UNSPECIFIED CHRONICITY: ICD-10-CM

## 2024-07-24 RX ORDER — ALPRAZOLAM 0.25 MG/1
TABLET ORAL
Qty: 60 TABLET | Refills: 5 | Status: SHIPPED | OUTPATIENT
Start: 2024-07-24

## 2024-10-25 NOTE — TELEPHONE ENCOUNTER
10:05-Patient called and asked if she can push up her sx. Date 1 week to 01/20/23 but I told her that Dr. Richards will be out that whole week and be returning on 01/23/23 and she said that she will keep er original sx. Date which is 01/27/23.    130

## 2024-10-29 NOTE — TELEPHONE ENCOUNTER
----- Message from Shruthi Ramsay sent at 12/6/2023 12:25 PM CST -----  .Type:  RX Refill Request    Who Called: pt  Refill or New Rx:refill  RX Name and Strength:DUEXIS 800-26.6 mg Tab  How is the patient currently taking it? (ex. 1XDay):1x day  Is this a 30 day or 90 day RX:90  [facility]  Local or Mail Order:[facility]  Ordering Provider:Levi  Would the patient rather a call back or a response via [facility]?   Best Call Back Number:[phone]  Additional Information: need refill   .Type:  RX Refill Request    Who Called: pt  Refill or New Rx:refill  RX Name and Strength:singular  How is the patient currently taking it? (ex. 1XDay):  Is this a 30 day or 90 day RX:90  Preferred [facility] phone number:[facility]  Local or Mail Order:mail  Ordering Provider:Levi  Would the patient rather a call back or a response via [facility]?   Best Call Back Number:[phone]  Additional Information: need refill        Speaking Coherently

## 2024-10-30 ENCOUNTER — TELEPHONE (OUTPATIENT)
Dept: INTERNAL MEDICINE | Facility: CLINIC | Age: 64
End: 2024-10-30
Payer: COMMERCIAL

## 2024-11-28 DIAGNOSIS — F32.A DEPRESSION, UNSPECIFIED DEPRESSION TYPE: ICD-10-CM

## 2024-12-02 RX ORDER — ESCITALOPRAM OXALATE 10 MG/1
10 TABLET ORAL
Qty: 90 TABLET | Refills: 3 | Status: SHIPPED | OUTPATIENT
Start: 2024-12-02

## 2024-12-16 ENCOUNTER — LAB VISIT (OUTPATIENT)
Dept: LAB | Facility: HOSPITAL | Age: 64
End: 2024-12-16
Attending: INTERNAL MEDICINE
Payer: COMMERCIAL

## 2024-12-16 DIAGNOSIS — R30.0 DYSURIA: ICD-10-CM

## 2024-12-16 DIAGNOSIS — Z00.00 WELLNESS EXAMINATION: ICD-10-CM

## 2024-12-16 LAB
25(OH)D3+25(OH)D2 SERPL-MCNC: 45 NG/ML (ref 30–80)
ALBUMIN SERPL-MCNC: 3.7 G/DL (ref 3.4–4.8)
ALBUMIN/GLOB SERPL: 1.6 RATIO (ref 1.1–2)
ALP SERPL-CCNC: 60 UNIT/L (ref 40–150)
ALT SERPL-CCNC: 22 UNIT/L (ref 0–55)
ANION GAP SERPL CALC-SCNC: 5 MEQ/L
AST SERPL-CCNC: 20 UNIT/L (ref 5–34)
BACTERIA #/AREA URNS AUTO: ABNORMAL /HPF
BASOPHILS # BLD AUTO: 0.05 X10(3)/MCL
BASOPHILS NFR BLD AUTO: 1.5 %
BILIRUB SERPL-MCNC: 0.4 MG/DL
BILIRUB UR QL STRIP.AUTO: NEGATIVE
BUN SERPL-MCNC: 23.4 MG/DL (ref 9.8–20.1)
CALCIUM SERPL-MCNC: 8.3 MG/DL (ref 8.4–10.2)
CHLORIDE SERPL-SCNC: 108 MMOL/L (ref 98–107)
CHOLEST SERPL-MCNC: 214 MG/DL
CHOLEST/HDLC SERPL: 3 {RATIO} (ref 0–5)
CLARITY UR: CLEAR
CO2 SERPL-SCNC: 28 MMOL/L (ref 23–31)
COLOR UR AUTO: ABNORMAL
CREAT SERPL-MCNC: 0.64 MG/DL (ref 0.55–1.02)
CREAT/UREA NIT SERPL: 37
EOSINOPHIL # BLD AUTO: 0.11 X10(3)/MCL (ref 0–0.9)
EOSINOPHIL NFR BLD AUTO: 3.3 %
ERYTHROCYTE [DISTWIDTH] IN BLOOD BY AUTOMATED COUNT: 12.3 % (ref 11.5–17)
GFR SERPLBLD CREATININE-BSD FMLA CKD-EPI: >60 ML/MIN/1.73/M2
GLOBULIN SER-MCNC: 2.3 GM/DL (ref 2.4–3.5)
GLUCOSE SERPL-MCNC: 95 MG/DL (ref 82–115)
GLUCOSE UR QL STRIP: NORMAL
HCT VFR BLD AUTO: 38.3 % (ref 37–47)
HDLC SERPL-MCNC: 81 MG/DL (ref 35–60)
HGB BLD-MCNC: 12.6 G/DL (ref 12–16)
HGB UR QL STRIP: NEGATIVE
IMM GRANULOCYTES # BLD AUTO: 0.01 X10(3)/MCL (ref 0–0.04)
IMM GRANULOCYTES NFR BLD AUTO: 0.3 %
KETONES UR QL STRIP: NEGATIVE
LDLC SERPL CALC-MCNC: 122 MG/DL (ref 50–140)
LEUKOCYTE ESTERASE UR QL STRIP: 250
LYMPHOCYTES # BLD AUTO: 1.49 X10(3)/MCL (ref 0.6–4.6)
LYMPHOCYTES NFR BLD AUTO: 44.3 %
MCH RBC QN AUTO: 31 PG (ref 27–31)
MCHC RBC AUTO-ENTMCNC: 32.9 G/DL (ref 33–36)
MCV RBC AUTO: 94.1 FL (ref 80–94)
MONOCYTES # BLD AUTO: 0.44 X10(3)/MCL (ref 0.1–1.3)
MONOCYTES NFR BLD AUTO: 13.1 %
MUCOUS THREADS URNS QL MICRO: ABNORMAL /LPF
NEUTROPHILS # BLD AUTO: 1.26 X10(3)/MCL (ref 2.1–9.2)
NEUTROPHILS NFR BLD AUTO: 37.5 %
NITRITE UR QL STRIP: NEGATIVE
NRBC BLD AUTO-RTO: 0 %
PH UR STRIP: 6 [PH]
PLATELET # BLD AUTO: 188 X10(3)/MCL (ref 130–400)
PMV BLD AUTO: 8.8 FL (ref 7.4–10.4)
POTASSIUM SERPL-SCNC: 4.4 MMOL/L (ref 3.5–5.1)
PROT SERPL-MCNC: 6 GM/DL (ref 5.8–7.6)
PROT UR QL STRIP: NEGATIVE
RBC # BLD AUTO: 4.07 X10(6)/MCL (ref 4.2–5.4)
RBC #/AREA URNS AUTO: ABNORMAL /HPF
SODIUM SERPL-SCNC: 141 MMOL/L (ref 136–145)
SP GR UR STRIP.AUTO: 1.02 (ref 1–1.03)
SQUAMOUS #/AREA URNS LPF: ABNORMAL /HPF
TRIGL SERPL-MCNC: 53 MG/DL (ref 37–140)
TSH SERPL-ACNC: 2.21 UIU/ML (ref 0.35–4.94)
UROBILINOGEN UR STRIP-ACNC: NORMAL
VLDLC SERPL CALC-MCNC: 11 MG/DL
WBC # BLD AUTO: 3.36 X10(3)/MCL (ref 4.5–11.5)
WBC #/AREA URNS AUTO: ABNORMAL /HPF

## 2024-12-16 PROCEDURE — 80053 COMPREHEN METABOLIC PANEL: CPT

## 2024-12-16 PROCEDURE — 82306 VITAMIN D 25 HYDROXY: CPT

## 2024-12-16 PROCEDURE — 84443 ASSAY THYROID STIM HORMONE: CPT

## 2024-12-16 PROCEDURE — 85025 COMPLETE CBC W/AUTO DIFF WBC: CPT

## 2024-12-16 PROCEDURE — 80061 LIPID PANEL: CPT

## 2024-12-16 PROCEDURE — 81001 URINALYSIS AUTO W/SCOPE: CPT

## 2024-12-16 PROCEDURE — 36415 COLL VENOUS BLD VENIPUNCTURE: CPT

## 2024-12-17 ENCOUNTER — TELEPHONE (OUTPATIENT)
Dept: INTERNAL MEDICINE | Facility: CLINIC | Age: 64
End: 2024-12-17
Payer: COMMERCIAL

## 2024-12-23 ENCOUNTER — OFFICE VISIT (OUTPATIENT)
Dept: INTERNAL MEDICINE | Facility: CLINIC | Age: 64
End: 2024-12-23
Payer: COMMERCIAL

## 2024-12-23 VITALS
BODY MASS INDEX: 26.53 KG/M2 | WEIGHT: 144.19 LBS | SYSTOLIC BLOOD PRESSURE: 128 MMHG | HEART RATE: 100 BPM | HEIGHT: 62 IN | OXYGEN SATURATION: 96 % | DIASTOLIC BLOOD PRESSURE: 78 MMHG

## 2024-12-23 DIAGNOSIS — Z12.11 SCREENING FOR COLORECTAL CANCER: ICD-10-CM

## 2024-12-23 DIAGNOSIS — B00.9 HERPES SIMPLEX: ICD-10-CM

## 2024-12-23 DIAGNOSIS — E55.9 VITAMIN D DEFICIENCY: Chronic | ICD-10-CM

## 2024-12-23 DIAGNOSIS — I10 PRIMARY HYPERTENSION: Chronic | ICD-10-CM

## 2024-12-23 DIAGNOSIS — E78.2 MIXED HYPERLIPIDEMIA: ICD-10-CM

## 2024-12-23 DIAGNOSIS — F41.1 GENERALIZED ANXIETY DISORDER: Chronic | ICD-10-CM

## 2024-12-23 DIAGNOSIS — T78.40XD ALLERGY, SUBSEQUENT ENCOUNTER: ICD-10-CM

## 2024-12-23 DIAGNOSIS — Z00.00 WELL ADULT EXAM: Primary | ICD-10-CM

## 2024-12-23 DIAGNOSIS — Z12.12 SCREENING FOR COLORECTAL CANCER: ICD-10-CM

## 2024-12-23 PROBLEM — T78.40XA ALLERGIES: Status: ACTIVE | Noted: 2024-12-23

## 2024-12-23 PROBLEM — D12.5 BENIGN NEOPLASM OF SIGMOID COLON: Status: ACTIVE | Noted: 2018-08-28

## 2024-12-23 PROBLEM — K57.30 DVRTCLOS OF LG INT W/O PERFORATION OR ABSCESS W/O BLEEDING: Status: ACTIVE | Noted: 2018-08-28

## 2024-12-23 RX ORDER — VALACYCLOVIR HYDROCHLORIDE 1 G/1
1000 TABLET, FILM COATED ORAL 2 TIMES DAILY
Qty: 24 TABLET | Refills: 0 | Status: SHIPPED | OUTPATIENT
Start: 2024-12-23 | End: 2025-12-23

## 2024-12-23 NOTE — PROGRESS NOTES
Internal Medicine    Patient Name:  Zandra Levy   Patient ID: [id number]     Chief Complaint: Annual Exam (wellness)      HPI:     Zandra Levy is a 64 y.o. female, known to Dr Moore, is here today for annual wellness.  Medical comorbidities include anxiety, HTN, HLD, osteopenia.  Recent labs reviewed and generally stable.  Age-appropriate screenings and immunizations reviewed and discussed.  She is due for repeat colonoscopy, referral order sent today.  Otherwise, she is doing well with no acute issues or concerns.    Last AWV: today (12/23/24)        Past Medical History:   Diagnosis Date    Generalized anxiety disorder 01/30/2024    Herpes simplex     Osteopenia     Vitamin D deficiency         Past Surgical History:   Procedure Laterality Date    ANTEGRADE INTRAMEDULLARY RODDING OF FEMUR Left     BREAST SURGERY      x 3 06/04/2022    BREAST SURGERY      12/08/22    CARPAL TUNNEL RELEASE Right 1/27/2023    Procedure: RELEASE, CARPAL TUNNEL;  Surgeon: Mike Richards MD;  Location: [facility] OR;  Service: Orthopedics;  Laterality: Right;    CLOSED REDUCTION OF INJURY OF SHOULDER Left     DUPUYTREN CONTRACTURE RELEASE Right 1/27/2023    Procedure: RELEASE, DUPUYTREN CONTRACTURE;  Surgeon: Mike Richards MD;  Location: [facility] OR;  Service: Orthopedics;  Laterality: Right;    HAND SURGERY      3 years ago    MOUTH SURGERY      fx palate    removal minda of femur Left     REPAIR, NERVE USING ALLOGRAFT Right 1/27/2023    Procedure: REPAIR, NERVE USING ALLOGRAFT - Microscope, axogen 1-2 x 15 graft, vistaseal, micro instruments;  Surgeon: Mike Richards MD;  Location: [facility] OR;  Service: Orthopedics;  Laterality: Right;  Microscope, axogen 1-2 x 15 graft, vistaseal, micro instruments    ROTATOR CUFF REPAIR Left     bicep tendon repair    ULNAR NERVE TRANSPOSITION Right 1/27/2023    Procedure: TRANSPOSITION, NERVE, ULNAR - endoscopic;  Surgeon: Mike Richards MD;  Location: [facility] OR;  Service: Orthopedics;   Laterality: Right;        Social History     Tobacco Use    Smoking status: Former     Current packs/day: 0.00     Types: Cigarettes     Quit date: 2014     Years since quitting: 10.9    Smokeless tobacco: Never   Substance and Sexual Activity    Alcohol use: Yes     Alcohol/week: 2.0 standard drinks of alcohol     Types: 2 Cans of beer per week     Comment: Social    Drug use: Yes     Types: Marijuana     Comment: Per patient medicial marijuana    Sexual activity: Yes        Current Outpatient Medications   Medication Instructions    ALPRAZolam (XANAX) 0.25 MG tablet TAKE 1 TABLET BY MOUTH ONCE DAILY AS NEEDED INSOMNIA OR FOR ANXIETY.    cholecalciferol (vitamin D3) (VITAMIN D3) 1,000 Units, Daily    clotrimazole-betamethasone 1-0.05% (LOTRISONE) cream Apply topically.    EScitalopram oxalate (LEXAPRO) 10 mg, Oral    fluticasone propionate (FLONASE) 50 mcg, Each Nostril, 2 times daily    ibuprofen-famotidine (DUEXIS) 800-26.6 mg Tab 1 tablet, Oral, Nightly    melatonin 5 mg Cap 1 capsule, Nightly    montelukast (SINGULAIR) 10 mg, Oral, Daily    mupirocin (BACTROBAN) 2 % ointment Topical (Top), 2 times daily    mupirocin (BACTROBAN) 2 % ointment Topical (Top), 2 times daily    NON FORMULARY MEDICATION 1 tablet, 2 times daily    NON FORMULARY MEDICATION As needed (PRN)    rosuvastatin (CRESTOR) 10 mg, Oral, Nightly    valACYclovir (VALTREX) 1,000 mg, Oral, 2 times daily    valsartan (DIOVAN) 80 mg, Oral, Daily       Review of patient's allergies indicates:   Allergen Reactions    Penicillins Shortness Of Breath     Pt has taken PCNs with no problems    Amoxicillin Diarrhea     Per patient     Amoxicillin (bulk)     Codeine Hives    Codeine phosphate (bulk)     Naproxen Diarrhea        Patient Care Team:  Tony Moore MD as PCP - General (Internal Medicine)  [facility]  Sherwin Jett MD as Consulting Physician (Obstetrics and Gynecology)  Errol Gaspar MD (Plastic  "Surgery)     Subjective:     Review of Systems   Constitutional:  Negative for appetite change, chills, diaphoresis and fever.   HENT:  Negative for ear pain, sinus pain and sore throat.    Eyes:  Negative for pain and visual disturbance.   Respiratory:  Negative for cough, shortness of breath and wheezing.    Cardiovascular:  Negative for chest pain, palpitations and leg swelling.   Gastrointestinal:  Negative for abdominal pain, constipation, diarrhea, nausea and vomiting.   Endocrine: Negative for cold intolerance.   Genitourinary:  Negative for difficulty urinating, dysuria, frequency and hematuria.   Musculoskeletal:  Negative for arthralgias and myalgias.   Skin:  Negative for color change and rash.   Allergic/Immunologic: Negative.    Neurological: Negative.  Negative for dizziness, syncope, light-headedness and numbness.   Hematological: Negative.    Psychiatric/Behavioral: Negative.  Negative for dysphoric mood. The patient is not nervous/anxious.    All other systems reviewed and are negative.      Objective:     Visit Vitals  /78 (BP Location: Right arm, Patient Position: Sitting)   Pulse 100   Ht 5' 2" (1.575 m)   Wt 65.4 kg (144 lb 3.2 oz)   SpO2 96%   BMI 26.37 kg/m²       Physical Exam  Vitals and nursing note reviewed.   Constitutional:       General: She is not in acute distress.     Appearance: She is not ill-appearing.   HENT:      Head: Normocephalic and atraumatic.      Right Ear: Tympanic membrane and ear canal normal.      Left Ear: Tympanic membrane and ear canal normal.      Nose: No congestion or rhinorrhea.      Mouth/Throat:      Mouth: Mucous membranes are moist.      Pharynx: No oropharyngeal exudate or posterior oropharyngeal erythema.   Eyes:      General: No scleral icterus.     Extraocular Movements: Extraocular movements intact.      Conjunctiva/sclera: Conjunctivae normal.      Pupils: Pupils are equal, round, and reactive to light.   Neck:      Vascular: No carotid bruit. "   Cardiovascular:      Rate and Rhythm: Normal rate and regular rhythm.      Heart sounds: Normal heart sounds. No murmur heard.     No friction rub. No gallop.   Pulmonary:      Effort: Pulmonary effort is normal. No respiratory distress.      Breath sounds: Normal breath sounds. No wheezing, rhonchi or rales.   Abdominal:      General: Bowel sounds are normal. There is no distension.      Palpations: Abdomen is soft. There is no mass.      Tenderness: There is no abdominal tenderness.   Musculoskeletal:         General: Normal range of motion.      Cervical back: Normal range of motion and neck supple.   Lymphadenopathy:      Cervical: No cervical adenopathy.   Skin:     General: Skin is warm and dry.      Capillary Refill: Capillary refill takes less than 2 seconds.   Neurological:      General: No focal deficit present.      Mental Status: She is alert and oriented to person, place, and time.   Psychiatric:         Mood and Affect: Mood normal.         Behavior: Behavior normal.         Labs Reviewed:     Chemistry:  Lab Results   Component Value Date     12/16/2024    K 4.4 12/16/2024    BUN 23.4 (H) 12/16/2024    CREATININE 0.64 12/16/2024    EGFRNORACEVR >60 12/16/2024    GLUCOSE 95 12/16/2024    CALCIUM 8.3 (L) 12/16/2024    ALKPHOS 60 12/16/2024    LABPROT 6.0 12/16/2024    ALBUMIN 3.7 12/16/2024    BILIDIR 0.2 10/27/2021    IBILI 0.40 10/27/2021    AST 20 12/16/2024    ALT 22 12/16/2024    [id number] 45 12/16/2024    TSH 2.206 12/16/2024        Hematology:  Lab Results   Component Value Date    WBC 3.36 (L) 12/16/2024    HGB 12.6 12/16/2024    HCT 38.3 12/16/2024     12/16/2024       Lipid Panel:  Lab Results   Component Value Date    CHOL 214 (H) 12/16/2024    HDL 81 (H) 12/16/2024    .00 12/16/2024    TRIG 53 12/16/2024    TOTALCHOLEST 3 12/16/2024        Urine:  Lab Results   Component Value Date    APPEARANCEUA Clear 12/16/2024    SGUA 1.025 12/16/2024    PROTEINUA Negative  12/16/2024    KETONESUA Negative 12/16/2024    LEUKOCYTESUR 250 (A) 12/16/2024    RBCUA 0-5 12/16/2024    WBCUA 0-5 12/16/2024    BACTERIA None Seen 12/16/2024    SQEPUA Trace 12/16/2024        Assessment:       ICD-10-CM ICD-9-CM   1. Well adult exam  Z00.00 V70.0   2. Allergy, subsequent encounter  T78.40XD V58.89   3. Mixed hyperlipidemia  E78.2 272.2   4. Primary hypertension  I10 401.9   5. Vitamin D deficiency  E55.9 268.9   6. Generalized anxiety disorder  F41.1 300.02   7. Herpes simplex  B00.9 054.9   8. Screening for colorectal cancer  Z12.11 V76.51    Z12.12 V76.41        Plan:     1. Well adult exam  Overview:  Obesity/Physical Activity - Body mass index is 26.37 kg/m²..  Goal BMI less than 30. Encouraged daily 30 minute physical activity x 5 days per week.    Cervical Cancer Screening - Last pap on 7/27/22.  Follow up with GYN (Dr Jett) for pap/pelvic exam.  Breast Cancer Screening - Last mammogram on 3/8/23, noting Heterogeneously dense breast tissue, no evidence of malignancy.  Follow up in 1 year.  Osteoporosis Screening - Will initiate screening at age 65.      Colon Cancer Screening - Colonoscopy on 8/28/18, noting few nonbleeding diverticula, mild diverticulosis, polyps.  Recommended repeat colonoscopy in 5 years.  Will send referral for repeat colonoscopy.    Eye Exam - Recommended annually.  Dental Exam - Recommend biannually.    Vaccinations -   Immunization History   Administered Date(s) Administered    COVID-19 Vaccine 04/22/2021, 05/20/2021, 12/08/2021    COVID-19, MRNA, LN-S, PF (MODERNA FULL 0.5 ML DOSE) 04/22/2021, 05/20/2021, 12/08/2021    COVID-19, mRNA, LNP-S, PF, evan-sucrose, 30 mcg/0.3 mL (Pfizer Ages 12+) 12/05/2023    COVID-19, mRNA, LNP-S, bivalent booster, PF (PFIZER OMICRON) 10/13/2022    Influenza - Quadrivalent - MDCK - PF 11/02/2020    Influenza - Quadrivalent - PF *Preferred* (6 months and older) 10/13/2022, 09/26/2023    Tdap 12/05/2023    Zoster Recombinant  02/03/2021, 01/13/2022, 03/31/2022        Assessment & Plan:  Patient feeling generally well today  wellness labs reviewed and generally stable  Age-appropriate screenings and Immunizations reviewed and discussed  Encourage routine aerobic exercise 2 to 3 times a week  Increase fluid hydration  Follow up in 12 months  or sooner if needed      2. Allergy, subsequent encounter  Assessment & Plan:  Reports previous allergic reaction to pain medications.  She would like referral to immunologist for formal allergy testing.    Orders:  -     Ambulatory referral/consult to Allergy; Future; Expected date: 12/30/2024    3. Mixed hyperlipidemia  Assessment & Plan:  Lab Results   Component Value Date    .00 12/16/2024    TRIG 53 12/16/2024    HDL 81 (H) 12/16/2024    TOTALCHOLEST 3 12/16/2024     Continue rosuvastatin 10 mg daily  Stressed importance of dietary modifications. Follow a low cholesterol, low saturated fat diet with less that 200mg of cholesterol a day.  Avoid fried foods and high saturated fats (high saturated fats less than 7% of calories).      4. Primary hypertension  Assessment & Plan:  Well-controlled   Continue valsartan 80 mg daily  Encouraged low-sodium diet      5. Vitamin D deficiency  Assessment & Plan:  Recent Vitamin-D 45   Continue use of OTC vitamin-D supplementation (recommended 1000 units daily)      6. Generalized anxiety disorder  Assessment & Plan:  Stable   Currently on Lexapro 10 mg daily and Xanax 0.25 mg daily as needed      7. Herpes simplex  Assessment & Plan:  Continue Valtrex to be used as directed.    Orders:  -     valACYclovir (VALTREX) 1000 MG tablet; Take 1 tablet (1,000 mg total) by mouth 2 (two) times daily.  Dispense: 24 tablet; Refill: 0    8. Screening for colorectal cancer  -     Ambulatory referral/consult to Gastroenterology; Future; Expected date: 12/30/2024           Follow up in about 1 year (around 12/23/2025) for Annual Wellness, with Dr Moore. In addition  to their scheduled follow up, the patient has also been instructed to follow up on as needed basis.       Future Appointments   Date Time Provider Department Center   1/5/2026 10:00 AM Tony Moore MD [place] [address]          JONY Barahona

## 2024-12-23 NOTE — ASSESSMENT & PLAN NOTE
Recent Vitamin-D 45   Continue use of OTC vitamin-D supplementation (recommended 1000 units daily)

## 2024-12-23 NOTE — ASSESSMENT & PLAN NOTE
Lab Results   Component Value Date    .00 12/16/2024    TRIG 53 12/16/2024    HDL 81 (H) 12/16/2024    TOTALCHOLEST 3 12/16/2024     Continue rosuvastatin 10 mg daily  Stressed importance of dietary modifications. Follow a low cholesterol, low saturated fat diet with less that 200mg of cholesterol a day.  Avoid fried foods and high saturated fats (high saturated fats less than 7% of calories).   Patient received b12 injection. Pt tolerated well, no adverse reactions.

## 2024-12-23 NOTE — ASSESSMENT & PLAN NOTE
Reports previous allergic reaction to pain medications.  She would like referral to immunologist for formal allergy testing.

## 2024-12-23 NOTE — ASSESSMENT & PLAN NOTE
Patient feeling generally well today  wellness labs reviewed and generally stable  Age-appropriate screenings and Immunizations reviewed and discussed  Encourage routine aerobic exercise 2 to 3 times a week  Increase fluid hydration  Follow up in 12 months  or sooner if needed

## 2025-01-15 ENCOUNTER — TELEPHONE (OUTPATIENT)
Dept: INTERNAL MEDICINE | Facility: CLINIC | Age: 65
End: 2025-01-15
Payer: COMMERCIAL

## 2025-01-15 DIAGNOSIS — M19.90 ARTHRITIS: ICD-10-CM

## 2025-01-15 RX ORDER — IBUPROFEN AND FAMOTIDINE 800; 26.6 MG/1; MG/1
1 TABLET, COATED ORAL NIGHTLY
Qty: 90 TABLET | Refills: 3 | Status: SHIPPED | OUTPATIENT
Start: 2025-01-15

## 2025-01-15 RX ORDER — IBUPROFEN AND FAMOTIDINE 800; 26.6 MG/1; MG/1
1 TABLET, COATED ORAL NIGHTLY
Qty: 90 TABLET | Refills: 3 | Status: SHIPPED | OUTPATIENT
Start: 2025-01-15 | End: 2025-01-15 | Stop reason: SDUPTHER

## 2025-01-15 NOTE — TELEPHONE ENCOUNTER
----- Message from Elida sent at 1/15/2025 10:56 AM CST -----  .Who Called: Zandra Levy    Refill or New Rx:Refill  RX Name and Strength:ibuprofen-famotidine (DUEXIS) 800-26.6 mg Tab  How is the patient currently taking it? (ex. 1XDay):1x  Is this a 30 day or 90 day RX:90  Local or Mail Order: local   List of preferred pharmacies on file (remove unneeded): [unfilled]  If different Pharmacy is requested, enter Pharmacy information here including location and phone number: Kee Abdullahi at [facility]    Ordering Provider:Levi      Preferred Method of Contact: Phone Call  Patient's Preferred Phone Number on File: [phone]   Best Call Back Number, if different:  Additional Information: pt states that original duexis only. Does not want the generic

## 2025-01-15 NOTE — TELEPHONE ENCOUNTER
----- Message from Elaine sent at 1/15/2025  2:15 PM CST -----  Who Called: Zandra Han [facility]    Pharmacy is calling to request assistance with Rx    Pharmacy name and phone number: [facility] [place] - [address]   Phone: [phone]  Fax: [phone]  Pharmacy contact: alber  Patient Name: zandra  Prescription Name: DUEXIS 800-26.6 mg Tab 90 tablet   What do they need to clarify?:cannot get name brand only generic    Preferred Method of Contact: Phone Call  Patient's Preferred Phone Number on File: [phone]   Best Call Back Number, if different:  Additional Information: pharmacy call, please advise, thanks

## 2025-01-15 NOTE — TELEPHONE ENCOUNTER
Left message for patient to call back. I spoke with pharmacy and they can get generic. Name brand is not available   Show Applicator Variable?: Yes Number Of Freeze-Thaw Cycles: 2 freeze-thaw cycles Consent: The patient's consent was obtained including but not limited to risks of crusting, scabbing, blistering, scarring, darker or lighter pigmentary change, recurrence, incomplete removal and infection. Detail Level: Zone Render Note In Bullet Format When Appropriate: No Duration Of Freeze Thaw-Cycle (Seconds): 3 Post-Care Instructions: I reviewed with the patient in detail post-care instructions. Patient is to wear sunprotection, and avoid picking at any of the treated lesions. Pt may apply Vaseline to crusted or scabbing areas. Medical Necessity Information: It is in your best interest to select a reason for this procedure from the list below. All of these items fulfill various CMS LCD requirements except the new and changing color options. Spray Paint Text: The liquid nitrogen was applied to the skin utilizing a spray paint frosting technique. Detail Level: Detailed Medical Necessity Clause: This procedure was medically necessary because the lesions that were treated were: at risk for and/or subject to recurrent physical trauma as a result of lesion type and location with history of the same, bleeding and irritated.

## 2025-01-15 NOTE — TELEPHONE ENCOUNTER
----- Message from Elaine sent at 1/15/2025  2:15 PM CST -----  Who Called: Zandra Han [facility]    Pharmacy is calling to request assistance with Rx    Pharmacy name and phone number: [facility] - [place] - [address]   Phone: [phone]  Fax: [phone]  Pharmacy contact: alber  Patient Name: zandra  Prescription Name: DUEXIS 800-26.6 mg Tab 90 tablet   What do they need to clarify?:cannot get name brand only generic    Preferred Method of Contact: Phone Call  Patient's Preferred Phone Number on File: [phone]   Best Call Back Number, if different:  Additional Information: pharmacy call, please advise, thanks

## 2025-01-15 NOTE — TELEPHONE ENCOUNTER
[facility] can not dispense brand name only. Called patient and informed her. [facility] I advised her that it was sent [facility] BRAND NAME ONLY. I advised the patient that she should call me if she has any issues

## 2025-01-27 ENCOUNTER — OFFICE VISIT (OUTPATIENT)
Dept: INTERNAL MEDICINE | Facility: CLINIC | Age: 65
End: 2025-01-27
Payer: COMMERCIAL

## 2025-01-27 VITALS
DIASTOLIC BLOOD PRESSURE: 80 MMHG | HEART RATE: 81 BPM | HEIGHT: 62 IN | RESPIRATION RATE: 16 BRPM | OXYGEN SATURATION: 98 % | SYSTOLIC BLOOD PRESSURE: 110 MMHG | BODY MASS INDEX: 27.42 KG/M2 | WEIGHT: 149 LBS

## 2025-01-27 DIAGNOSIS — G47.62 NOCTURNAL LEG CRAMPS: ICD-10-CM

## 2025-01-27 DIAGNOSIS — M79.644 FINGER PAIN, RIGHT: ICD-10-CM

## 2025-01-27 DIAGNOSIS — K21.00 GASTROESOPHAGEAL REFLUX DISEASE WITH ESOPHAGITIS WITHOUT HEMORRHAGE: ICD-10-CM

## 2025-01-27 DIAGNOSIS — M25.512 ACUTE PAIN OF LEFT SHOULDER: Primary | ICD-10-CM

## 2025-01-27 PROCEDURE — 99214 OFFICE O/P EST MOD 30 MIN: CPT | Mod: ,,, | Performed by: INTERNAL MEDICINE

## 2025-01-27 NOTE — ASSESSMENT & PLAN NOTE
In the past she had seen Dr. Pitt   This time she wants to have x-rays and MRI ordered this will be ordered for her evaluation.

## 2025-01-27 NOTE — PROGRESS NOTES
Tony Sims MD        PATIENT NAME: Zandra Levy  : 1960  DATE: 25  MRN: [id number]      Billing Provider: Tony Sims MD  Level of Service:   Patient PCP Information       Provider PCP Type    Tony Sims MD General            Reason for Visit / Chief Complaint: Pain (Patient reports her rotator cuff and bicep cuff has been in pain. She reports that she ripped them about 7 years ago. She also report her right ring finger hurts. She had trigger finger and it was repaired. ) and Medication Problem (She is having trouble with her Duexis)       Update PCP  Update Chief Complaint         History of Present Illness / Problem Focused Workflow     Zandra Levy presents to the clinic with Pain (Patient reports her rotator cuff and bicep cuff has been in pain. She reports that she ripped them about 7 years ago. She also report her right ring finger hurts. She had trigger finger and it was repaired. ) and Medication Problem (She is having trouble with her Duexis)     Patient is here with multiple issues   First she is having severe pain in her left shoulder and upper arm   She has a history of having surgery do her torn rotator cuff and she fears her rotator cuff is torn again.    She is also having pain in her right ring finger.  She is complaining of leg cramps at night as well as dry skin.        Review of Systems   Review of Systems   Constitutional: Negative.    HENT: Negative.     Eyes: Negative.    Respiratory: Negative.     Cardiovascular: Negative.    Gastrointestinal: Negative.    Endocrine: Negative.    Genitourinary: Negative.    Musculoskeletal: Negative.    Integumentary:  Negative.   Neurological: Negative.    Psychiatric/Behavioral: Negative.          Medical / Social / Family History     Past Medical History:   Diagnosis Date    Generalized anxiety disorder 2024    Herpes simplex     Osteopenia     Vitamin D deficiency        Past Surgical History:    Procedure Laterality Date    ANTEGRADE INTRAMEDULLARY RODDING OF FEMUR Left     BREAST SURGERY      x 3 06/04/2022    BREAST SURGERY      12/08/22    CARPAL TUNNEL RELEASE Right 1/27/2023    Procedure: RELEASE, CARPAL TUNNEL;  Surgeon: Mike Richards MD;  Location: [facility];  Service: Orthopedics;  Laterality: Right;    CLOSED REDUCTION OF INJURY OF SHOULDER Left     DUPUYTREN CONTRACTURE RELEASE Right 1/27/2023    Procedure: RELEASE, DUPUYTREN CONTRACTURE;  Surgeon: Mike Richards MD;  Location: [facility] OR;  Service: Orthopedics;  Laterality: Right;    HAND SURGERY      3 years ago    MOUTH SURGERY      fx palate    removal minda of femur Left     REPAIR, NERVE USING ALLOGRAFT Right 1/27/2023    Procedure: REPAIR, NERVE USING ALLOGRAFT - Microscope, axogen 1-2 x 15 graft, vistaseal, micro instruments;  Surgeon: Mike Richards MD;  Location: [facility] OR;  Service: Orthopedics;  Laterality: Right;  Microscope, axogen 1-2 x 15 graft, vistaseal, micro instruments    ROTATOR CUFF REPAIR Left     bicep tendon repair    ULNAR NERVE TRANSPOSITION Right 1/27/2023    Procedure: TRANSPOSITION, NERVE, ULNAR - endoscopic;  Surgeon: Mike Richards MD;  Location: [facility] OR;  Service: Orthopedics;  Laterality: Right;       Social History  Ms. Levy  reports that she quit smoking about 11 years ago. Her smoking use included cigarettes. She has never used smokeless tobacco. She reports current alcohol use of about 2.0 standard drinks of alcohol per week. She reports current drug use. Drug: Marijuana.    Family History  Ms.'s Levy  family history includes Cancer in her father and sister; Diabetes in her brother and mother; Heart attack in her brother and sister.    Medications and Allergies     Medications  Outpatient Medications Marked as Taking for the 1/27/25 encounter (Office Visit) with Tony Moore MD   Medication Sig Dispense Refill    ALPRAZolam (XANAX) 0.25 MG tablet TAKE 1 TABLET BY MOUTH ONCE DAILY AS NEEDED INSOMNIA OR  FOR ANXIETY. 60 tablet 5    cholecalciferol, vitamin D3, (VITAMIN D3) 25 mcg (1,000 unit) capsule Take 1,000 Units by mouth once daily.      clotrimazole-betamethasone 1-0.05% (LOTRISONE) cream Apply topically.      DUEXIS 800-26.6 mg Tab Take 1 tablet by mouth every evening. 90 tablet 3    EScitalopram oxalate (LEXAPRO) 10 MG tablet TAKE 1 TABLET DAILY 90 tablet 3    fluticasone propionate (FLONASE) 50 mcg/actuation nasal spray 1 spray (50 mcg total) by Each Nostril route 2 (two) times daily. 11.1 mL 6    melatonin 5 mg Cap Take 1 capsule by mouth nightly.      mupirocin (BACTROBAN) 2 % ointment Apply topically 2 (two) times daily. 15 g 0    mupirocin (BACTROBAN) 2 % ointment Apply topically 2 (two) times daily. 30 g 1    NON FORMULARY MEDICATION Take 1 tablet by mouth 2 (two) times a day.      NON FORMULARY MEDICATION Take by mouth as needed.      rosuvastatin (CRESTOR) 10 MG tablet Take 1 tablet (10 mg total) by mouth every evening. 30 tablet 0    valACYclovir (VALTREX) 1000 MG tablet Take 1 tablet (1,000 mg total) by mouth 2 (two) times daily. 24 tablet 0    valsartan (DIOVAN) 80 MG tablet Take 1 tablet (80 mg total) by mouth once daily. 90 tablet 3       Allergies  Review of patient's allergies indicates:   Allergen Reactions    Penicillins Shortness Of Breath     Pt has taken PCNs with no problems    Amoxicillin Diarrhea     Per patient     Amoxicillin (bulk)     Codeine Hives    Codeine phosphate (bulk)     Naproxen Diarrhea       Physical Examination     Vitals:    01/27/25 0811   BP: 110/80   Pulse: 81   Resp: 16     Physical Exam  Constitutional:       Appearance: Normal appearance.   HENT:      Head: Normocephalic and atraumatic.      Right Ear: Tympanic membrane normal.      Left Ear: Tympanic membrane normal.      Nose: Nose normal.      Mouth/Throat:      Mouth: Mucous membranes are moist.   Eyes:      Extraocular Movements: Extraocular movements intact.      Pupils: Pupils are equal, round, and  reactive to light.   Cardiovascular:      Rate and Rhythm: Normal rate and regular rhythm.      Pulses: Normal pulses.   Pulmonary:      Effort: Pulmonary effort is normal.      Breath sounds: Normal breath sounds.   Abdominal:      General: Abdomen is flat. Bowel sounds are normal.      Palpations: Abdomen is soft.   Musculoskeletal:         General: Normal range of motion.      Cervical back: Normal range of motion and neck supple.      Comments: Left shoulder painful with range of motion.   Skin:     General: Skin is warm and dry.   Neurological:      General: No focal deficit present.      Mental Status: She is alert and oriented to person, place, and time.   Psychiatric:         Mood and Affect: Mood normal.         Behavior: Behavior normal.          Assessment and Plan (including Health Maintenance)      Problem List  Smart Sets  Document Outside HM   :    Plan:    ICD-10-CM ICD-9-CM   1. Acute pain of left shoulder  M25.512 719.41   2. Finger pain, right  M79.644 729.5   3. Gastroesophageal reflux disease with esophagitis without hemorrhage  K21.00 530.81     530.10   4. Nocturnal leg cramps  G47.62 327.52       Problem List Items Addressed This Visit          GI    Gastroesophageal reflux disease with esophagitis without hemorrhage       Orthopedic    Acute pain of left shoulder - Primary     In the past she had seen Dr. Pitt   This time she wants to have x-rays and MRI ordered this will be ordered for her evaluation.         Finger pain, right     The pain is in the tip of the finger nothing to do for this.            Other    Nocturnal leg cramps     Begin magnesium oxide at night                   Health Maintenance Due   Topic Date Due    Hemoglobin A1c (Diabetic Prevention Screening)  Never done    RSV Vaccine (Age 60+ and Pregnant patients) (1 - Risk 60-74 years 1-dose series) Never done    Colorectal Cancer Screening  08/28/2023    Mammogram  03/08/2024       Problem List Items Addressed This  Visit          GI    Gastroesophageal reflux disease with esophagitis without hemorrhage       Orthopedic    Acute pain of left shoulder - Primary    Current Assessment & Plan     In the past she had seen Dr. Pitt   This time she wants to have x-rays and MRI ordered this will be ordered for her evaluation.         Finger pain, right    Current Assessment & Plan     The pain is in the tip of the finger nothing to do for this.            Other    Nocturnal leg cramps    Current Assessment & Plan     Begin magnesium oxide at night            Health Maintenance Topics with due status: Not Due       Topic Last Completion Date    Cervical Cancer Screening 07/27/2022    TETANUS VACCINE 12/05/2023    Lipid Panel 12/16/2024       Future Appointments   Date Time Provider Department Center   1/5/2026 10:00 AM Tony Moore MD [place] [id number]      I spent a total of 36 minutes on the day of the visit.This includes face to face time and non-face to face time preparing to see the patient (eg, review of tests), obtaining and/or reviewing separately obtained history, documenting clinical information in the electronic or other health record, independently interpreting results and communicating results to the patient/family/caregiver, or care coordinator.        Signature:  Tony Moore MD  [facility]  [address] [address]    Date of encounter: 1/27/25

## 2025-01-30 ENCOUNTER — TELEPHONE (OUTPATIENT)
Dept: INTERNAL MEDICINE | Facility: CLINIC | Age: 65
End: 2025-01-30
Payer: COMMERCIAL

## 2025-01-30 DIAGNOSIS — R41.89 COGNITIVE DECLINE: Primary | ICD-10-CM

## 2025-01-30 DIAGNOSIS — M25.512 ACUTE PAIN OF LEFT SHOULDER: ICD-10-CM

## 2025-01-30 NOTE — TELEPHONE ENCOUNTER
Attempted to reach pt regarding MRI orders and testing for dementia and memory  , left voicemail

## 2025-01-30 NOTE — TELEPHONE ENCOUNTER
Please check my last visit I thought I ordered an MRI of her shoulder   Let her know for testing for dementia and memory to refer to Neurology.

## 2025-01-30 NOTE — TELEPHONE ENCOUNTER
Spoke with pt regarding MRI and getting test for alzheimer's and dementia, pt is asking for the MRI also include the biceps and rotator cuff and to be tested for alzheimer's and dementia due to starting to have memory issues.

## 2025-02-05 ENCOUNTER — TELEPHONE (OUTPATIENT)
Dept: INTERNAL MEDICINE | Facility: CLINIC | Age: 65
End: 2025-02-05
Payer: COMMERCIAL

## 2025-02-05 DIAGNOSIS — M25.512 ACUTE PAIN OF LEFT SHOULDER: Primary | ICD-10-CM

## 2025-02-05 NOTE — TELEPHONE ENCOUNTER
----- Message from Viktoriya sent at 2/5/2025  7:46 AM CST -----  Regarding: denial  [employer] has denied mri left shoulder case#[id number], patient came in 2/4 to do x-rays, however, pt needs 6 weeks of provider directed treatment to be completed within the past three months. Denial has been scanned into media manager. Please let us know how you would like to proceed with this case.    Thanks,    Viktoriya Ramachandran  [facility]

## 2025-02-10 DIAGNOSIS — R41.89 COGNITIVE DECLINE: Primary | ICD-10-CM

## 2025-02-13 ENCOUNTER — APPOINTMENT (OUTPATIENT)
Dept: RADIOLOGY | Facility: HOSPITAL | Age: 65
End: 2025-02-13
Attending: INTERNAL MEDICINE
Payer: COMMERCIAL

## 2025-02-13 DIAGNOSIS — M25.512 ACUTE PAIN OF LEFT SHOULDER: ICD-10-CM

## 2025-02-13 PROCEDURE — 73218 MRI UPPER EXTREMITY W/O DYE: CPT | Mod: TC,LT

## 2025-02-14 DIAGNOSIS — M25.512 LEFT SHOULDER PAIN, UNSPECIFIED CHRONICITY: ICD-10-CM

## 2025-02-14 RX ORDER — ALPRAZOLAM 0.25 MG/1
TABLET ORAL
Qty: 60 TABLET | Refills: 5 | Status: SHIPPED | OUTPATIENT
Start: 2025-02-14

## 2025-02-17 ENCOUNTER — RESULTS FOLLOW-UP (OUTPATIENT)
Dept: INTERNAL MEDICINE | Facility: CLINIC | Age: 65
End: 2025-02-17
Payer: COMMERCIAL

## 2025-02-17 DIAGNOSIS — T78.40XD ALLERGY, SUBSEQUENT ENCOUNTER: ICD-10-CM

## 2025-02-17 RX ORDER — FLUTICASONE PROPIONATE 50 MCG
1 SPRAY, SUSPENSION (ML) NASAL 2 TIMES DAILY
Qty: 48 G | Refills: 3 | Status: SHIPPED | OUTPATIENT
Start: 2025-02-17

## 2025-02-17 NOTE — TELEPHONE ENCOUNTER
----- Message from Tony Moore MD sent at 2/17/2025 11:01 AM CST -----      ----- Message -----  From: Interface, Rad Results In  Sent: 2/17/2025  10:23 AM CST  To: Tony Moore MD

## 2025-02-25 DIAGNOSIS — M19.90 ARTHRITIS: ICD-10-CM

## 2025-02-26 RX ORDER — IBUPROFEN AND FAMOTIDINE 26.6; 8 MG/1; MG/1
1 TABLET ORAL NIGHTLY
Qty: 90 TABLET | Refills: 3 | Status: SHIPPED | OUTPATIENT
Start: 2025-02-26 | End: 2025-02-27 | Stop reason: SDUPTHER

## 2025-02-27 ENCOUNTER — TELEPHONE (OUTPATIENT)
Dept: INTERNAL MEDICINE | Facility: CLINIC | Age: 65
End: 2025-02-27
Payer: COMMERCIAL

## 2025-02-27 DIAGNOSIS — M19.90 ARTHRITIS: ICD-10-CM

## 2025-02-27 RX ORDER — IBUPROFEN AND FAMOTIDINE 26.6; 8 MG/1; MG/1
1 TABLET ORAL NIGHTLY
Qty: 90 TABLET | Refills: 3 | Status: SHIPPED | OUTPATIENT
Start: 2025-02-27

## 2025-02-27 NOTE — TELEPHONE ENCOUNTER
----- Message from Rodrick sent at 2/27/2025 12:28 PM CST -----  .Who Called: Archie with [facility] is requesting assistance/information from provider's office.Symptoms (please be specific): N/A How long has patient had these symptoms:  N/AList of preferred pharmacies on file (remove unneeded): [unfilled]If different, enter pharmacy into here including location and phone number: N/APreferred Method of Contact: Phone CallPatient's Preferred Phone Number on File: [phone] Best Call Back Number, if different: [phone] ([facility])Additional Information: Called to f/u on PA for pt's ibuprofen-famotidine (DUEXIS) 800-26.6 mg Tab. Stated he doesn't see where PA was started. Also stated once approved, please provide new prescription. Please advise, thank you.

## 2025-04-15 LAB — CRC RECOMMENDATION EXT: NORMAL

## 2025-06-02 ENCOUNTER — PATIENT OUTREACH (OUTPATIENT)
Facility: CLINIC | Age: 65
End: 2025-06-02
Payer: COMMERCIAL

## 2025-06-02 DIAGNOSIS — Z86.0100 HISTORY OF COLONIC POLYPS: Primary | ICD-10-CM

## 2025-06-19 DIAGNOSIS — I10 PRIMARY HYPERTENSION: ICD-10-CM

## 2025-06-19 RX ORDER — VALSARTAN 80 MG/1
80 TABLET ORAL
Qty: 90 TABLET | Refills: 3 | Status: SHIPPED | OUTPATIENT
Start: 2025-06-19

## 2025-07-07 DIAGNOSIS — B00.9 HERPES SIMPLEX: ICD-10-CM

## 2025-07-07 DIAGNOSIS — I10 PRIMARY HYPERTENSION: ICD-10-CM

## 2025-07-07 RX ORDER — VALSARTAN 80 MG/1
80 TABLET ORAL DAILY
Qty: 90 TABLET | Refills: 3 | Status: SHIPPED | OUTPATIENT
Start: 2025-07-07 | End: 2025-07-08 | Stop reason: SDUPTHER

## 2025-07-07 RX ORDER — ACYCLOVIR 50 MG/G
CREAM TOPICAL
Qty: 5 G | Refills: 3 | Status: SHIPPED | OUTPATIENT
Start: 2025-07-07

## 2025-07-07 RX ORDER — VALACYCLOVIR HYDROCHLORIDE 1 G/1
1000 TABLET, FILM COATED ORAL 2 TIMES DAILY
Qty: 24 TABLET | Refills: 0 | Status: SHIPPED | OUTPATIENT
Start: 2025-07-07 | End: 2026-07-07

## 2025-07-08 ENCOUNTER — TELEPHONE (OUTPATIENT)
Dept: INTERNAL MEDICINE | Facility: CLINIC | Age: 65
End: 2025-07-08
Payer: COMMERCIAL

## 2025-07-08 DIAGNOSIS — I10 PRIMARY HYPERTENSION: ICD-10-CM

## 2025-07-08 RX ORDER — VALSARTAN 80 MG/1
80 TABLET ORAL DAILY
Qty: 90 TABLET | Refills: 3 | Status: SHIPPED | OUTPATIENT
Start: 2025-07-08

## 2025-07-08 RX ORDER — VALSARTAN 80 MG/1
80 TABLET ORAL DAILY
Qty: 90 TABLET | Refills: 3 | Status: SHIPPED | OUTPATIENT
Start: 2025-07-08 | End: 2025-07-08 | Stop reason: SDUPTHER

## 2025-07-08 NOTE — TELEPHONE ENCOUNTER
Copied from CRM #[id number]. Topic: Medications - Medication Refill  >> Jul 8, 2025 11:21 AM Leslie wrote:  .Who Called: Zandrakirby Han Cam    Patient is returning phone call    Who Left Message for Patient:  Does the patient know what this is regarding?:PT requesting valsartan to be sent to [facility] script as 90 day refill-it was sent to [place] pharmacy instead       Preferred Method of Contact: Phone Call  Patient's Preferred Phone Number on File: [phone]   Best Call Back Number, if different:  Additional Information:

## 2025-07-08 NOTE — TELEPHONE ENCOUNTER
Spoke to patient and advised her that I sent Valsartan to [facility]. She stated that she [facility] but it was fine. I apologized for the mix up and advised her I would send it to the correct pharmacy, she voiced understanding and thanks.

## 2025-07-08 NOTE — TELEPHONE ENCOUNTER
Copied from CRM #[id number]. Topic: General Inquiry - Patient Advice  >> Jul 8, 2025 10:29 AM Mikie wrote:  .Who Called: Zandra Levy    Caller is requesting assistance/information from provider's office.    Symptoms (please be specific): na   How long has patient had these symptoms:  na  List of preferred pharmacies on file (remove unneeded): [unfilled]  If different, enter pharmacy into here including location and phone number: na      Preferred Method of Contact: Phone Call  Patient's Preferred Phone Number on File: [phone]   Best Call Back Number, if different:  Additional Information: pt wants nurse to give her a call regarding her medications

## 2025-07-10 NOTE — TELEPHONE ENCOUNTER
Called patient to see if she received medication. She stated that she has not gotten it, I advised her to ask what the cash pay price is and if it is too expensive to call me back and I can attempt to call the insurance.

## 2025-07-15 ENCOUNTER — TELEPHONE (OUTPATIENT)
Dept: INTERNAL MEDICINE | Facility: CLINIC | Age: 65
End: 2025-07-15
Payer: COMMERCIAL

## 2025-07-15 NOTE — TELEPHONE ENCOUNTER
Spoke to patient and informed her that I would call her insurance and see if I could get an approval over the phone.

## 2025-07-15 NOTE — TELEPHONE ENCOUNTER
PA Case #: [id number]  PA denied while on the phone.  Called the patient and informed her of insurance determination. Advised patient she could pay out of pocket for medication. She voiced understanding and thanks,.

## 2025-07-15 NOTE — TELEPHONE ENCOUNTER
Copied from CRM #[id number]. Topic: General Inquiry - Patient Advice  >> Jul 15, 2025  8:14 AM Rosalia wrote:  Who Called: Zandra Levy    Caller is requesting assistance/information from provider's office.    Symptoms (please be specific): n/a   How long has patient had these symptoms:  n/a  List of preferred pharmacies on file (remove unneeded): [facility] [place] [address] [address]          Preferred Method of Contact: Phone Call  Patient's Preferred Phone Number on File: [phone]   Best Call Back Number, if different:  Additional Information: Pt stated that she still did not receive rx for  acyclovir 5% (ZOVIRAX) 5 % Crea. PA still stating: waiting for payer response. Please advise.

## 2025-07-16 ENCOUNTER — TELEPHONE (OUTPATIENT)
Dept: INTERNAL MEDICINE | Facility: CLINIC | Age: 65
End: 2025-07-16
Payer: COMMERCIAL

## 2025-07-16 DIAGNOSIS — B00.9 HERPES SIMPLEX: Primary | ICD-10-CM

## 2025-07-16 NOTE — TELEPHONE ENCOUNTER
Copied from CRM #[id number]. Topic: Medications - Pharmacy  >> Jul 16, 2025 10:11 AM Elaine wrote:  Who Called: Trupti in regards to Zandra Han Cam    Pharmacy is calling to request assistance with Rx    Pharmacy name and phone number: [facility] - [place] - [address]   Phone: [phone]  Fax: [phone]        Pharmacy contact: trupti   Patient Name: zandra  Prescription Name: acyclovir 5% (ZOVIRAX) 5 % Cream 5 g   What do they need to clarify?:insurance will not cover cost is $400.00+, another alternative request for acyclovir 5% (ZOVIRAX) 5 % ointment instead        Preferred Method of Contact: Phone Call  Patient's Preferred Phone Number on File: [phone]   Best Call Back Number, if different:**trupti [phone]**  Additional Information: pharmacy call, please advise, thanks

## 2025-07-17 RX ORDER — VALACYCLOVIR HYDROCHLORIDE 1 G/1
1000 TABLET, FILM COATED ORAL 3 TIMES DAILY
Qty: 21 TABLET | Refills: 1 | Status: SHIPPED | OUTPATIENT
Start: 2025-07-17 | End: 2026-07-17

## 2025-07-31 ENCOUNTER — TELEPHONE (OUTPATIENT)
Dept: INTERNAL MEDICINE | Facility: CLINIC | Age: 65
End: 2025-07-31
Payer: COMMERCIAL

## 2025-07-31 NOTE — TELEPHONE ENCOUNTER
Copied from CRM #[id number]. Topic: General Inquiry - Patient Advice  >> Jul 30, 2025  8:28 AM Rosanna wrote:  Who Called: Zandra Levy    Caller is requesting assistance/information from provider's office.    Symptoms (please be specific):    How long has patient had these symptoms:    List of preferred pharmacies on file (remove unneeded): [unfilled]  If different, enter pharmacy into here including location and phone number:       Preferred Method of Contact: Phone Call  Patient's Preferred Phone Number on File: [phone]   Best Call Back Number, if different:  Additional Information:  Pt called to speak with the nurse about insurance question

## 2025-07-31 NOTE — TELEPHONE ENCOUNTER
Patient called regarding letting from [employer], informed patient that it is a scam and to disregard.she voiced understanding and thanks

## 2025-08-14 ENCOUNTER — PATIENT OUTREACH (OUTPATIENT)
Dept: ADMINISTRATIVE | Facility: HOSPITAL | Age: 65
End: 2025-08-14
Payer: COMMERCIAL

## 2025-08-20 DIAGNOSIS — M25.512 LEFT SHOULDER PAIN, UNSPECIFIED CHRONICITY: ICD-10-CM

## 2025-08-20 RX ORDER — ALPRAZOLAM 0.25 MG/1
TABLET ORAL
Qty: 60 TABLET | Refills: 5 | Status: SHIPPED | OUTPATIENT
Start: 2025-08-20

## 2025-08-29 ENCOUNTER — TELEPHONE (OUTPATIENT)
Dept: INTERNAL MEDICINE | Facility: CLINIC | Age: 65
End: 2025-08-29
Payer: COMMERCIAL

## 2025-08-29 ENCOUNTER — PATIENT MESSAGE (OUTPATIENT)
Facility: CLINIC | Age: 65
End: 2025-08-29
Payer: COMMERCIAL

## 2025-08-29 DIAGNOSIS — M19.90 ARTHRITIS: ICD-10-CM

## 2025-08-29 RX ORDER — IBUPROFEN AND FAMOTIDINE 26.6; 8 MG/1; MG/1
1 TABLET ORAL NIGHTLY
Qty: 90 TABLET | Refills: 3 | Status: SHIPPED | OUTPATIENT
Start: 2025-08-29

## (undated) DEVICE — BANDAGE ACE ELASTIC 6"

## (undated) DEVICE — Device

## (undated) DEVICE — SOL NACL IRR 1000ML BTL

## (undated) DEVICE — DRESSING XEROFORM FOIL PK 1X8

## (undated) DEVICE — KIT DRAIN WOUND RND SPRNG RESV

## (undated) DEVICE — SPLINT PLASTER F.S 4INX15IN

## (undated) DEVICE — KIT SURGICAL TURNOVER

## (undated) DEVICE — BANDAGE GAUZE 6PLY FLUFF 2X3

## (undated) DEVICE — SPONGE DERMACEA GAUZE 4X4

## (undated) DEVICE — GLOVE PROTEXIS BLUE LATEX 7.5

## (undated) DEVICE — BANDAGE VELCLOSE ELAS 3INX5YD

## (undated) DEVICE — DRAPE SURG W/TWL 17 5/8X23

## (undated) DEVICE — SUT ETHILON 3-0 PS2 18 BLK

## (undated) DEVICE — GLOVE PROTEXIS LTX MICRO  7

## (undated) DEVICE — SUT 5/0 18IN PLAIN GUT D/A

## (undated) DEVICE — SEALANT VISTASEAL FIBRIN 10ML

## (undated) DEVICE — SUT 4-0 ETHILON 18 PS-2

## (undated) DEVICE — KIT ANTIFOG W/SPONG & FLUID

## (undated) DEVICE — DRAPE HAND STERILE

## (undated) DEVICE — SLING ARM LARGE

## (undated) DEVICE — ELECTRODE PATIENT RETURN DISP

## (undated) DEVICE — APPLICATOR CHLORAPREP ORN 26ML

## (undated) DEVICE — SPONGE KERLIX SUPER 6X6.75IN

## (undated) DEVICE — BANDAGE KERLIX AMD

## (undated) DEVICE — SUT MONOCRYL 3-0 PS-2 UND

## (undated) DEVICE — GOWN NONREINF SET-IN SLV XL

## (undated) DEVICE — BANDAGE VELCLOSE ELAS 2INX5YD

## (undated) DEVICE — CUFF ATS 2 PORT SNGL BLDR 18IN

## (undated) DEVICE — BLADE SURG STAINLESS STEEL #15

## (undated) DEVICE — DRESSING XEROFORM 1X8IN

## (undated) DEVICE — CORD BIPOLAR 12 FOOT

## (undated) DEVICE — BANDAGE ESMARK LATEX FREE 4INX

## (undated) DEVICE — SUT SUPRAMID 3-0 BB 12X.375IN